# Patient Record
Sex: FEMALE | Race: WHITE | NOT HISPANIC OR LATINO | Employment: OTHER | URBAN - METROPOLITAN AREA
[De-identification: names, ages, dates, MRNs, and addresses within clinical notes are randomized per-mention and may not be internally consistent; named-entity substitution may affect disease eponyms.]

---

## 2021-03-05 ENCOUNTER — APPOINTMENT (EMERGENCY)
Dept: RADIOLOGY | Facility: HOSPITAL | Age: 66
End: 2021-03-05
Payer: MEDICARE

## 2021-03-05 ENCOUNTER — APPOINTMENT (EMERGENCY)
Dept: ULTRASOUND IMAGING | Facility: HOSPITAL | Age: 66
End: 2021-03-05
Payer: MEDICARE

## 2021-03-05 ENCOUNTER — HOSPITAL ENCOUNTER (EMERGENCY)
Facility: HOSPITAL | Age: 66
Discharge: HOME | End: 2021-03-05
Attending: EMERGENCY MEDICINE | Admitting: EMERGENCY MEDICINE
Payer: MEDICARE

## 2021-03-05 VITALS
DIASTOLIC BLOOD PRESSURE: 72 MMHG | WEIGHT: 203 LBS | OXYGEN SATURATION: 98 % | HEART RATE: 70 BPM | HEIGHT: 64 IN | TEMPERATURE: 98 F | RESPIRATION RATE: 18 BRPM | SYSTOLIC BLOOD PRESSURE: 165 MMHG | BODY MASS INDEX: 34.66 KG/M2

## 2021-03-05 DIAGNOSIS — R60.9 PERIPHERAL EDEMA: Primary | ICD-10-CM

## 2021-03-05 LAB
ALBUMIN SERPL BCP-MCNC: 3.4 G/DL (ref 3.5–5)
ALP SERPL-CCNC: 90 U/L (ref 46–116)
ALT SERPL W P-5'-P-CCNC: 68 U/L (ref 12–78)
ANION GAP SERPL CALCULATED.3IONS-SCNC: 7 MMOL/L (ref 4–13)
AST SERPL W P-5'-P-CCNC: 79 U/L (ref 5–45)
BASOPHILS # BLD AUTO: 0.06 THOUSANDS/ΜL (ref 0–0.1)
BASOPHILS NFR BLD AUTO: 1 % (ref 0–1)
BILIRUB SERPL-MCNC: 0.59 MG/DL (ref 0.2–1)
BUN SERPL-MCNC: 13 MG/DL (ref 5–25)
CALCIUM ALBUM COR SERPL-MCNC: 9.4 MG/DL (ref 8.3–10.1)
CALCIUM SERPL-MCNC: 8.9 MG/DL (ref 8.3–10.1)
CHLORIDE SERPL-SCNC: 103 MMOL/L (ref 100–108)
CO2 SERPL-SCNC: 32 MMOL/L (ref 21–32)
CREAT SERPL-MCNC: 0.7 MG/DL (ref 0.6–1.3)
EOSINOPHIL # BLD AUTO: 0.14 THOUSAND/ΜL (ref 0–0.61)
EOSINOPHIL NFR BLD AUTO: 2 % (ref 0–6)
ERYTHROCYTE [DISTWIDTH] IN BLOOD BY AUTOMATED COUNT: 15 % (ref 11.6–15.1)
GFR SERPL CREATININE-BSD FRML MDRD: 91 ML/MIN/1.73SQ M
GLUCOSE SERPL-MCNC: 125 MG/DL (ref 65–140)
HCT VFR BLD AUTO: 47.7 % (ref 34.8–46.1)
HGB BLD-MCNC: 15.7 G/DL (ref 11.5–15.4)
IMM GRANULOCYTES # BLD AUTO: 0.02 THOUSAND/UL (ref 0–0.2)
IMM GRANULOCYTES NFR BLD AUTO: 0 % (ref 0–2)
LYMPHOCYTES # BLD AUTO: 1.61 THOUSANDS/ΜL (ref 0.6–4.47)
LYMPHOCYTES NFR BLD AUTO: 21 % (ref 14–44)
MCH RBC QN AUTO: 35.9 PG (ref 26.8–34.3)
MCHC RBC AUTO-ENTMCNC: 32.9 G/DL (ref 31.4–37.4)
MCV RBC AUTO: 109 FL (ref 82–98)
MONOCYTES # BLD AUTO: 0.55 THOUSAND/ΜL (ref 0.17–1.22)
MONOCYTES NFR BLD AUTO: 7 % (ref 4–12)
NEUTROPHILS # BLD AUTO: 5.39 THOUSANDS/ΜL (ref 1.85–7.62)
NEUTS SEG NFR BLD AUTO: 69 % (ref 43–75)
NRBC BLD AUTO-RTO: 0 /100 WBCS
NT-PROBNP SERPL-MCNC: 182 PG/ML
PLATELET # BLD AUTO: 147 THOUSANDS/UL (ref 149–390)
PMV BLD AUTO: 10.5 FL (ref 8.9–12.7)
POTASSIUM SERPL-SCNC: 3.7 MMOL/L (ref 3.5–5.3)
PROT SERPL-MCNC: 7.7 G/DL (ref 6.4–8.2)
RBC # BLD AUTO: 4.37 MILLION/UL (ref 3.81–5.12)
SODIUM SERPL-SCNC: 142 MMOL/L (ref 136–145)
TROPONIN I SERPL-MCNC: <0.02 NG/ML
WBC # BLD AUTO: 7.77 THOUSAND/UL (ref 4.31–10.16)

## 2021-03-05 PROCEDURE — 71045 X-RAY EXAM CHEST 1 VIEW: CPT

## 2021-03-05 PROCEDURE — 36415 COLL VENOUS BLD VENIPUNCTURE: CPT | Performed by: PHYSICIAN ASSISTANT

## 2021-03-05 PROCEDURE — 84484 ASSAY OF TROPONIN QUANT: CPT | Performed by: PHYSICIAN ASSISTANT

## 2021-03-05 PROCEDURE — 80053 COMPREHEN METABOLIC PANEL: CPT | Performed by: PHYSICIAN ASSISTANT

## 2021-03-05 PROCEDURE — 93971 EXTREMITY STUDY: CPT

## 2021-03-05 PROCEDURE — 85025 COMPLETE CBC W/AUTO DIFF WBC: CPT | Performed by: PHYSICIAN ASSISTANT

## 2021-03-05 PROCEDURE — 99284 EMERGENCY DEPT VISIT MOD MDM: CPT

## 2021-03-05 PROCEDURE — 93971 EXTREMITY STUDY: CPT | Performed by: SURGERY

## 2021-03-05 PROCEDURE — 99285 EMERGENCY DEPT VISIT HI MDM: CPT | Performed by: PHYSICIAN ASSISTANT

## 2021-03-05 PROCEDURE — 83880 ASSAY OF NATRIURETIC PEPTIDE: CPT | Performed by: PHYSICIAN ASSISTANT

## 2021-03-05 NOTE — ED PROVIDER NOTES
History  Chief Complaint   Patient presents with    Leg Swelling     Pt reports having b/l leg swelling since Jan 2 65 yo with leg swelling  Onset January second  Constant since onset  Bilateral but left worse than right  States they feel "rock hard"  Pain in left calf  No pain on right  No color change  No rash or redness  No fever or chills  Has never had this before  Has chronic sob and dyspnea on exertion 2/2 to her COPD and is currently unchanged from baseline  Denies chest pain  No h/o VTE  No recent travels, traumas or surgeries  States she came for evaluation today because her daughter is here getting routine labs drawn and she figured she would have it evaluated while she was getting those labs done  History provided by:  Patient   used: No    Leg Pain  Lower extremity pain location: bilateral lower extremities  Time since incident:  2 months  Injury: no    Pain details:     Quality:  Aching    Radiates to:  Does not radiate    Severity:  Moderate    Onset quality:  Gradual    Duration:  2 months    Timing:  Intermittent    Progression:  Waxing and waning  Chronicity:  New  Dislocation: no    Foreign body present:  No foreign bodies  Prior injury to area:  No  Relieved by:  Nothing  Worsened by:  Nothing  Ineffective treatments:  None tried  Associated symptoms: swelling    Associated symptoms: no back pain, no decreased ROM, no fatigue, no fever, no itching, no muscle weakness, no neck pain, no numbness, no stiffness and no tingling        None       Past Medical History:   Diagnosis Date    COPD (chronic obstructive pulmonary disease) ([facility] [address] )     Hyperlipemia     Hypertension        History reviewed  No pertinent surgical history  History reviewed  No pertinent family history  I have reviewed and agree with the history as documented      E-Cigarette/Vaping    E-Cigarette Use Never User      E-Cigarette/Vaping Substances     Social History     Tobacco Use    Smoking status: Current Every Day Smoker     Packs/day: 0 50    Smokeless tobacco: Never Used   Substance Use Topics    Alcohol use: Yes     Drinks per session: 1 or 2    Drug use: Never       Review of Systems   Constitutional: Negative for activity change, appetite change, chills, diaphoresis, fatigue, fever and unexpected weight change  HENT: Negative for congestion, rhinorrhea, sinus pressure, sore throat and trouble swallowing  Eyes: Negative for photophobia and visual disturbance  Respiratory: Negative for apnea, cough, choking, chest tightness, shortness of breath, wheezing and stridor  Cardiovascular: Positive for leg swelling  Negative for chest pain and palpitations  Gastrointestinal: Negative for abdominal distention, abdominal pain, blood in stool, constipation, diarrhea, nausea and vomiting  Genitourinary: Negative for decreased urine volume, difficulty urinating, dysuria, enuresis, flank pain, frequency, hematuria and urgency  Musculoskeletal: Negative for arthralgias, back pain, myalgias, neck pain, neck stiffness and stiffness  Skin: Negative for color change, itching, pallor, rash and wound  Allergic/Immunologic: Negative  Neurological: Negative for dizziness, tremors, syncope, weakness, light-headedness, numbness and headaches  Hematological: Negative  Psychiatric/Behavioral: Negative  All other systems reviewed and are negative  Physical Exam  Physical Exam  Vitals signs and nursing note reviewed  Constitutional:       General: She is not in acute distress  Appearance: Normal appearance  She is well-developed  She is not ill-appearing, toxic-appearing or diaphoretic  HENT:      Head: Normocephalic and atraumatic  Eyes:      General: Lids are normal       Pupils: Pupils are equal, round, and reactive to light  Neck:      Musculoskeletal: Normal range of motion and neck supple  Cardiovascular:      Rate and Rhythm: Normal rate and regular rhythm  Pulses: Normal pulses  No decreased pulses  Radial pulses are 2+ on the right side and 2+ on the left side  Heart sounds: Normal heart sounds, S1 normal and S2 normal  Heart sounds not distant  No murmur  No friction rub  No gallop  Pulmonary:      Effort: Pulmonary effort is normal  No tachypnea, bradypnea, accessory muscle usage or respiratory distress  Breath sounds: Normal breath sounds  No decreased breath sounds, wheezing, rhonchi or rales  Abdominal:      General: There is no distension  Palpations: Abdomen is soft  Abdomen is not rigid  Tenderness: There is no abdominal tenderness  There is no guarding or rebound  Musculoskeletal: Normal range of motion  General: No tenderness or deformity  Right lower le+ Pitting Edema present  Left lower le+ Pitting Edema present  Skin:     General: Skin is warm and dry  Coloration: Skin is not pale  Findings: No erythema or rash  Neurological:      Mental Status: She is alert and oriented to person, place, and time  GCS: GCS eye subscore is 4  GCS verbal subscore is 5  GCS motor subscore is 6  Cranial Nerves: No cranial nerve deficit     Psychiatric:         Speech: Speech normal          Vital Signs  ED Triage Vitals [21 1420]   Temperature Pulse Respirations Blood Pressure SpO2   98 °F (36 7 °C) 69 16 160/70 98 %      Temp Source Heart Rate Source Patient Position - Orthostatic VS BP Location FiO2 (%)   Oral Monitor Sitting Left arm --      Pain Score       --           Vitals:    21 1420 21 1744   BP: 160/70 165/72   Pulse: 69 70   Patient Position - Orthostatic VS: Sitting Sitting         Visual Acuity      ED Medications  Medications - No data to display    Diagnostic Studies  Results Reviewed     Procedure Component Value Units Date/Time    NT-BNP PRO [[id number]]  (Abnormal) Collected: 21 1617    Lab Status: Final result Specimen: Blood from Arm, Right Updated: 03/05/21 1700     NT-proBNP 182 pg/mL     Troponin I [[id number]]  (Normal) Collected: 03/05/21 1617    Lab Status: Final result Specimen: Blood from Arm, Right Updated: 03/05/21 1658     Troponin I <0 02 ng/mL     Comprehensive metabolic panel [[id number]]  (Abnormal) Collected: 03/05/21 1617    Lab Status: Final result Specimen: Blood from Arm, Right Updated: 03/05/21 1649     Sodium 142 mmol/L      Potassium 3 7 mmol/L      Chloride 103 mmol/L      CO2 32 mmol/L      ANION GAP 7 mmol/L      BUN 13 mg/dL      Creatinine 0 70 mg/dL      Glucose 125 mg/dL      Calcium 8 9 mg/dL      Corrected Calcium 9 4 mg/dL      AST 79 U/L      ALT 68 U/L      Alkaline Phosphatase 90 U/L      Total Protein 7 7 g/dL      Albumin 3 4 g/dL      Total Bilirubin 0 59 mg/dL      eGFR 91 ml/min/1 73sq m     Narrative:      [place] guidelines for Chronic Kidney Disease (CKD):     Stage 1 with normal or high GFR (GFR > 90 mL/min/1 73 square meters)    Stage 2 Mild CKD (GFR = 60-89 mL/min/1 73 square meters)    Stage 3A Moderate CKD (GFR = 45-59 mL/min/1 73 square meters)    Stage 3B Moderate CKD (GFR = 30-44 mL/min/1 73 square meters)    Stage 4 Severe CKD (GFR = 15-29 mL/min/1 73 square meters)    Stage 5 End Stage CKD (GFR <15 mL/min/1 73 square meters)  Note: GFR calculation is accurate only with a steady state creatinine    CBC and differential [[id number]]  (Abnormal) Collected: 03/05/21 1617    Lab Status: Final result Specimen: Blood from Arm, Right Updated: 03/05/21 1624     WBC 7 77 Thousand/uL      RBC 4 37 Million/uL      Hemoglobin 15 7 g/dL      Hematocrit 47 7 %       fL      MCH 35 9 pg      MCHC 32 9 g/dL      RDW 15 0 %      MPV 10 5 fL      Platelets 864 Thousands/uL      nRBC 0 /100 WBCs      Neutrophils Relative 69 %      Immat GRANS % 0 %      Lymphocytes Relative 21 %      Monocytes Relative 7 %      Eosinophils Relative 2 %      Basophils Relative 1 %      Neutrophils Absolute 5 39 Thousands/µL      Immature Grans Absolute 0 02 Thousand/uL      Lymphocytes Absolute 1 61 Thousands/µL      Monocytes Absolute 0 55 Thousand/µL      Eosinophils Absolute 0 14 Thousand/µL      Basophils Absolute 0 06 Thousands/µL                  VAS lower limb venous duplex study, unilateral/limited   Final Result by Theresa Ray DO (03/05 2023)      XR chest portable   Final Result by Fawad Henriquez MD (03/05 1559)      No acute cardiopulmonary disease  Workstation performed: [id number]                    Procedures  ECG 12 Lead Documentation Only    Date/Time: 3/5/2021 3:56 PM  Performed by: Noe Deleon PA-C  Authorized by: Noe Deleon PA-C     Indications / Diagnosis:  Leg swelling  ECG reviewed by me, the ED Provider: yes    Patient location:  ED  Previous ECG:     Previous ECG:  Unavailable    Comparison to cardiac monitor: Yes    Interpretation:     Interpretation: normal    Quality:     Tracing quality:  Limited by artifact  Rate:     ECG rate:  56    ECG rate assessment: normal    Rhythm:     Rhythm: sinus bradycardia    Ectopy:     Ectopy: none    QRS:     QRS axis:  Normal    QRS intervals:  Normal  Conduction:     Conduction: normal    ST segments:     ST segments:  Normal  T waves:     T waves: normal               ED Course  ED Course as of Mar 08 2154   Fri Mar 05, 2021   1611 Prelim DVT report negative  VAS lower limb venous duplex study, unilateral/limited                             SBIRT 22yo+      Most Recent Value   SBIRT (24 yo +)   In order to provide better care to our patients, we are screening all of our patients for alcohol and drug use  Would it be okay to ask you these screening questions? Yes Filed at: 03/05/2021 1420   Initial Alcohol Screen: US AUDIT-C    1  How often do you have a drink containing alcohol?  0 Filed at: 03/05/2021 1420   2  How many drinks containing alcohol do you have on a typical day you are drinking?    0 Filed at: 03/05/2021 1420   3a  Male UNDER 65: How often do you have five or more drinks on one occasion? 0 Filed at: 03/05/2021 1420   3b  FEMALE Any Age, or MALE 65+: How often do you have 4 or more drinks on one occassion? 0 Filed at: 03/05/2021 1420   Audit-C Score  0 Filed at: 03/05/2021 1420   GHANSHYAM: How many times in the past year have you    Used an illegal drug or used a prescription medication for non-medical reasons? Never Filed at: 03/05/2021 1420                    MDM  Number of Diagnoses or Management Options  Peripheral edema: new and requires workup  Diagnosis management comments: DDx including but not limited to: metabolic abnormality, renal failure, thyroid disease, CHF, doubt DVT  Plan: US leg  Labs  XR chest         Amount and/or Complexity of Data Reviewed  Clinical lab tests: ordered and reviewed  Tests in the radiology section of CPT®: ordered and reviewed  Independent visualization of images, tracings, or specimens: yes    Risk of Complications, Morbidity, and/or Mortality  Presenting problems: moderate  Management options: low  General comments: 76 female with leg swelling  3 months  Labs overall unremarkable  XR chest negative  Duplex US negative  Unclear etiology  Possibly dependent edema  Recommended Compression stockings and outpatient follow up  Return if worsening symptoms  Return parameters provided  Pt understands and agrees with plan  Patient Progress  Patient progress: stable      Disposition  Final diagnoses:   Peripheral edema     Time reflects when diagnosis was documented in both MDM as applicable and the Disposition within this note     Time User Action Codes Description Comment    3/5/2021  5:35 PM Ministerio Vargas [R60 9] Peripheral edema       ED Disposition     ED Disposition Condition Date/Time Comment    Discharge Stable Fri Mar 5, 2021  5:35 PM Nelda Fonseca discharge to home/self care              Follow-up Information     Follow up With Specialties Details Why Contact Info    Eugenia Yung MD Internal Medicine   [facility] [address]  [phone]            There are no discharge medications for this patient  No discharge procedures on file      PDMP Review     None          ED Provider  Electronically Signed by           Yudi De Anda PA-C  03/08/21 7814

## 2022-10-24 ENCOUNTER — HOSPITAL ENCOUNTER (INPATIENT)
Facility: HOSPITAL | Age: 67
LOS: 1 days | Discharge: HOME | End: 2022-10-25
Attending: EMERGENCY MEDICINE | Admitting: STUDENT IN AN ORGANIZED HEALTH CARE EDUCATION/TRAINING PROGRAM
Payer: MEDICARE

## 2022-10-24 ENCOUNTER — APPOINTMENT (EMERGENCY)
Dept: CT IMAGING | Facility: HOSPITAL | Age: 67
End: 2022-10-24
Payer: MEDICARE

## 2022-10-24 ENCOUNTER — APPOINTMENT (EMERGENCY)
Dept: RADIOLOGY | Facility: HOSPITAL | Age: 67
End: 2022-10-24
Payer: MEDICARE

## 2022-10-24 ENCOUNTER — APPOINTMENT (INPATIENT)
Dept: NON INVASIVE DIAGNOSTICS | Facility: HOSPITAL | Age: 67
End: 2022-10-24
Payer: MEDICARE

## 2022-10-24 DIAGNOSIS — I10 PRIMARY HYPERTENSION: ICD-10-CM

## 2022-10-24 DIAGNOSIS — J44.1 COPD EXACERBATION (HCC): Primary | ICD-10-CM

## 2022-10-24 DIAGNOSIS — R77.8 ELEVATED TROPONIN: ICD-10-CM

## 2022-10-24 DIAGNOSIS — R79.89 ELEVATED BRAIN NATRIURETIC PEPTIDE (BNP) LEVEL: ICD-10-CM

## 2022-10-24 DIAGNOSIS — R09.02 HYPOXIA: ICD-10-CM

## 2022-10-24 PROBLEM — I5A MYOCARDIAL INJURY: Status: ACTIVE | Noted: 2022-10-24

## 2022-10-24 PROBLEM — J96.01 ACUTE RESPIRATORY FAILURE WITH HYPOXIA (HCC): Status: ACTIVE | Noted: 2022-10-24

## 2022-10-24 PROBLEM — E04.1 THYROID NODULE: Status: ACTIVE | Noted: 2022-10-24

## 2022-10-24 LAB
2HR DELTA HS TROPONIN: 93 NG/L
4HR DELTA HS TROPONIN: 90 NG/L
ALBUMIN SERPL BCP-MCNC: 3.5 G/DL (ref 3.5–5)
ALP SERPL-CCNC: 115 U/L (ref 46–116)
ALT SERPL W P-5'-P-CCNC: 54 U/L (ref 12–78)
ANION GAP SERPL CALCULATED.3IONS-SCNC: 10 MMOL/L (ref 4–13)
AORTIC ROOT: 2.9 CM
AORTIC VALVE MEAN VELOCITY: 13.5 M/S
APICAL FOUR CHAMBER EJECTION FRACTION: 70 %
ASCENDING AORTA: 3.9 CM
AST SERPL W P-5'-P-CCNC: 53 U/L (ref 5–45)
ATRIAL RATE: 60 BPM
AV AREA BY CONTINUOUS VTI: 1.9 CM2
AV AREA PEAK VELOCITY: 1.8 CM2
AV LVOT MEAN GRADIENT: 4 MMHG
AV LVOT PEAK GRADIENT: 9 MMHG
AV MEAN GRADIENT: 8 MMHG
AV PEAK GRADIENT: 17 MMHG
AV VALVE AREA: 1.87 CM2
AV VELOCITY RATIO: 0.72
BASE EX.OXY STD BLDV CALC-SCNC: 62.8 % (ref 60–80)
BASE EXCESS BLDV CALC-SCNC: 3.4 MMOL/L
BASOPHILS # BLD AUTO: 0.04 THOUSANDS/ÂΜL (ref 0–0.1)
BASOPHILS NFR BLD AUTO: 0 % (ref 0–1)
BILIRUB DIRECT SERPL-MCNC: 0.29 MG/DL (ref 0–0.2)
BILIRUB SERPL-MCNC: 0.94 MG/DL (ref 0.2–1)
BUN SERPL-MCNC: 7 MG/DL (ref 5–25)
CALCIUM SERPL-MCNC: 8.9 MG/DL (ref 8.3–10.1)
CARDIAC TROPONIN I PNL SERPL HS: 222 NG/L
CARDIAC TROPONIN I PNL SERPL HS: 312 NG/L
CARDIAC TROPONIN I PNL SERPL HS: 315 NG/L
CHLORIDE SERPL-SCNC: 100 MMOL/L (ref 96–108)
CO2 SERPL-SCNC: 30 MMOL/L (ref 21–32)
CREAT SERPL-MCNC: 0.71 MG/DL (ref 0.6–1.3)
DOP CALC AO PEAK VEL: 2.08 M/S
DOP CALC AO VTI: 47.79 CM
DOP CALC LVOT AREA: 2.54 CM2
DOP CALC LVOT DIAMETER: 1.8 CM
DOP CALC LVOT PEAK VEL VTI: 35.21 CM
DOP CALC LVOT PEAK VEL: 1.49 M/S
DOP CALC LVOT STROKE INDEX: 47.1 ML/M2
DOP CALC LVOT STROKE VOLUME: 89.55 CM3
E WAVE DECELERATION TIME: 279 MS
EOSINOPHIL # BLD AUTO: 0.37 THOUSAND/ÂΜL (ref 0–0.61)
EOSINOPHIL NFR BLD AUTO: 4 % (ref 0–6)
ERYTHROCYTE [DISTWIDTH] IN BLOOD BY AUTOMATED COUNT: 15.2 % (ref 11.6–15.1)
FLUAV RNA RESP QL NAA+PROBE: NEGATIVE
FLUBV RNA RESP QL NAA+PROBE: NEGATIVE
FRACTIONAL SHORTENING: 44 % (ref 28–44)
GFR SERPL CREATININE-BSD FRML MDRD: 88 ML/MIN/1.73SQ M
GLUCOSE SERPL-MCNC: 117 MG/DL (ref 65–140)
HCO3 BLDV-SCNC: 31.9 MMOL/L (ref 24–30)
HCT VFR BLD AUTO: 47.6 % (ref 34.8–46.1)
HGB BLD-MCNC: 15.8 G/DL (ref 11.5–15.4)
IMM GRANULOCYTES # BLD AUTO: 0.04 THOUSAND/UL (ref 0–0.2)
IMM GRANULOCYTES NFR BLD AUTO: 0 % (ref 0–2)
INTERVENTRICULAR SEPTUM IN DIASTOLE (PARASTERNAL SHORT AXIS VIEW): 0.9 CM
INTERVENTRICULAR SEPTUM: 0.9 CM (ref 0.6–1.1)
LAAS-AP2: 20.6 CM2
LAAS-AP4: 21.7 CM2
LEFT ATRIUM AREA SYSTOLE SINGLE PLANE A4C: 22 CM2
LEFT ATRIUM SIZE: 3.9 CM
LEFT INTERNAL DIMENSION IN SYSTOLE: 2.9 CM (ref 2.1–4)
LEFT VENTRICULAR INTERNAL DIMENSION IN DIASTOLE: 5.2 CM (ref 3.5–6)
LEFT VENTRICULAR POSTERIOR WALL IN END DIASTOLE: 0.7 CM
LEFT VENTRICULAR STROKE VOLUME: 97 ML
LVSV (TEICH): 97 ML
LYMPHOCYTES # BLD AUTO: 1.77 THOUSANDS/ÂΜL (ref 0.6–4.47)
LYMPHOCYTES NFR BLD AUTO: 19 % (ref 14–44)
MCH RBC QN AUTO: 35.4 PG (ref 26.8–34.3)
MCHC RBC AUTO-ENTMCNC: 33.2 G/DL (ref 31.4–37.4)
MCV RBC AUTO: 107 FL (ref 82–98)
MONOCYTES # BLD AUTO: 0.56 THOUSAND/ÂΜL (ref 0.17–1.22)
MONOCYTES NFR BLD AUTO: 6 % (ref 4–12)
MV E'TISSUE VEL-SEP: 6 CM/S
MV PEAK A VEL: 1.28 M/S
MV PEAK E VEL: 88 CM/S
MV STENOSIS PRESSURE HALF TIME: 81 MS
MV VALVE AREA P 1/2 METHOD: 2.72 CM2
NEUTROPHILS # BLD AUTO: 6.79 THOUSANDS/ÂΜL (ref 1.85–7.62)
NEUTS SEG NFR BLD AUTO: 71 % (ref 43–75)
NRBC BLD AUTO-RTO: 0 /100 WBCS
NT-PROBNP SERPL-MCNC: 782 PG/ML
O2 CT BLDV-SCNC: 15.1 ML/DL
P AXIS: 64 DEGREES
PA SYSTOLIC PRESSURE: 43 MMHG
PCO2 BLDV: 63.4 MM HG (ref 42–50)
PH BLDV: 7.32 [PH] (ref 7.3–7.4)
PLATELET # BLD AUTO: 153 THOUSANDS/UL (ref 149–390)
PMV BLD AUTO: 11.3 FL (ref 8.9–12.7)
PO2 BLDV: 37.4 MM HG (ref 35–45)
POTASSIUM SERPL-SCNC: 3.4 MMOL/L (ref 3.5–5.3)
PR INTERVAL: 136 MS
PROCALCITONIN SERPL-MCNC: 0.05 NG/ML
PROT SERPL-MCNC: 8.2 G/DL (ref 6.4–8.4)
QRS AXIS: 62 DEGREES
QRSD INTERVAL: 106 MS
QT INTERVAL: 452 MS
QTC INTERVAL: 452 MS
RBC # BLD AUTO: 4.46 MILLION/UL (ref 3.81–5.12)
RIGHT ATRIUM AREA SYSTOLE A4C: 15.6 CM2
RIGHT VENTRICLE ID DIMENSION: 3.8 CM
RSV RNA RESP QL NAA+PROBE: NEGATIVE
SARS-COV-2 RNA RESP QL NAA+PROBE: NEGATIVE
SL CV LEFT ATRIUM LENGTH A2C: 5.6 CM
SL CV LV EF: 60
SL CV PED ECHO LEFT VENTRICLE DIASTOLIC VOLUME (MOD BIPLANE) 2D: 128 ML
SL CV PED ECHO LEFT VENTRICLE SYSTOLIC VOLUME (MOD BIPLANE) 2D: 31 ML
SODIUM SERPL-SCNC: 140 MMOL/L (ref 135–147)
T WAVE AXIS: 105 DEGREES
TR MAX PG: 38 MMHG
TR PEAK VELOCITY: 3.1 M/S
TRICUSPID VALVE PEAK REGURGITATION VELOCITY: 3.09 M/S
VENTRICULAR RATE: 60 BPM
WBC # BLD AUTO: 9.57 THOUSAND/UL (ref 4.31–10.16)

## 2022-10-24 PROCEDURE — 93005 ELECTROCARDIOGRAM TRACING: CPT

## 2022-10-24 PROCEDURE — 82805 BLOOD GASES W/O2 SATURATION: CPT | Performed by: EMERGENCY MEDICINE

## 2022-10-24 PROCEDURE — 80076 HEPATIC FUNCTION PANEL: CPT | Performed by: EMERGENCY MEDICINE

## 2022-10-24 PROCEDURE — 94760 N-INVAS EAR/PLS OXIMETRY 1: CPT

## 2022-10-24 PROCEDURE — 99291 CRITICAL CARE FIRST HOUR: CPT | Performed by: EMERGENCY MEDICINE

## 2022-10-24 PROCEDURE — 0241U HB NFCT DS VIR RESP RNA 4 TRGT: CPT | Performed by: EMERGENCY MEDICINE

## 2022-10-24 PROCEDURE — 85025 COMPLETE CBC W/AUTO DIFF WBC: CPT | Performed by: EMERGENCY MEDICINE

## 2022-10-24 PROCEDURE — 99223 1ST HOSP IP/OBS HIGH 75: CPT | Performed by: INTERNAL MEDICINE

## 2022-10-24 PROCEDURE — 94640 AIRWAY INHALATION TREATMENT: CPT

## 2022-10-24 PROCEDURE — 83880 ASSAY OF NATRIURETIC PEPTIDE: CPT | Performed by: EMERGENCY MEDICINE

## 2022-10-24 PROCEDURE — 93306 TTE W/DOPPLER COMPLETE: CPT

## 2022-10-24 PROCEDURE — 99223 1ST HOSP IP/OBS HIGH 75: CPT | Performed by: FAMILY MEDICINE

## 2022-10-24 PROCEDURE — 71275 CT ANGIOGRAPHY CHEST: CPT

## 2022-10-24 PROCEDURE — 84145 PROCALCITONIN (PCT): CPT | Performed by: PHYSICIAN ASSISTANT

## 2022-10-24 PROCEDURE — 84484 ASSAY OF TROPONIN QUANT: CPT | Performed by: EMERGENCY MEDICINE

## 2022-10-24 PROCEDURE — 71045 X-RAY EXAM CHEST 1 VIEW: CPT

## 2022-10-24 PROCEDURE — 80048 BASIC METABOLIC PNL TOTAL CA: CPT | Performed by: EMERGENCY MEDICINE

## 2022-10-24 PROCEDURE — 36415 COLL VENOUS BLD VENIPUNCTURE: CPT | Performed by: EMERGENCY MEDICINE

## 2022-10-24 PROCEDURE — G1004 CDSM NDSC: HCPCS

## 2022-10-24 PROCEDURE — 93306 TTE W/DOPPLER COMPLETE: CPT | Performed by: INTERNAL MEDICINE

## 2022-10-24 RX ORDER — ASPIRIN 81 MG/1
81 TABLET ORAL DAILY
COMMUNITY
End: 2022-10-25

## 2022-10-24 RX ORDER — LISINOPRIL 20 MG/1
20 TABLET ORAL DAILY
Status: DISCONTINUED | OUTPATIENT
Start: 2022-10-24 | End: 2022-10-25 | Stop reason: HOSPADM

## 2022-10-24 RX ORDER — ATORVASTATIN CALCIUM 40 MG/1
40 TABLET, FILM COATED ORAL DAILY
COMMUNITY
Start: 2022-09-19

## 2022-10-24 RX ORDER — IPRATROPIUM BROMIDE AND ALBUTEROL SULFATE .5; 3 MG/3ML; MG/3ML
2 SOLUTION RESPIRATORY (INHALATION) ONCE
Status: COMPLETED | OUTPATIENT
Start: 2022-10-24 | End: 2022-10-24

## 2022-10-24 RX ORDER — FUROSEMIDE 20 MG/1
20 TABLET ORAL DAILY
COMMUNITY
End: 2022-10-25

## 2022-10-24 RX ORDER — ALBUTEROL SULFATE 90 UG/1
2 AEROSOL, METERED RESPIRATORY (INHALATION) EVERY 6 HOURS PRN
Status: DISCONTINUED | OUTPATIENT
Start: 2022-10-24 | End: 2022-10-25 | Stop reason: HOSPADM

## 2022-10-24 RX ORDER — ATORVASTATIN CALCIUM 40 MG/1
40 TABLET, FILM COATED ORAL DAILY
Status: DISCONTINUED | OUTPATIENT
Start: 2022-10-24 | End: 2022-10-25 | Stop reason: HOSPADM

## 2022-10-24 RX ORDER — ACETAMINOPHEN 325 MG/1
650 TABLET ORAL ONCE
Status: COMPLETED | OUTPATIENT
Start: 2022-10-24 | End: 2022-10-24

## 2022-10-24 RX ORDER — METHYLPREDNISOLONE SOD SUCC 125 MG
1 VIAL (EA) INJECTION ONCE
Status: COMPLETED | OUTPATIENT
Start: 2022-10-24 | End: 2022-10-24

## 2022-10-24 RX ORDER — ALPRAZOLAM 0.25 MG/1
0.25 TABLET ORAL 2 TIMES DAILY PRN
COMMUNITY
Start: 2022-09-09

## 2022-10-24 RX ORDER — ASPIRIN 81 MG/1
81 TABLET ORAL DAILY
COMMUNITY

## 2022-10-24 RX ORDER — LEVALBUTEROL 1.25 MG/.5ML
1.25 SOLUTION, CONCENTRATE RESPIRATORY (INHALATION)
Status: DISCONTINUED | OUTPATIENT
Start: 2022-10-24 | End: 2022-10-25 | Stop reason: HOSPADM

## 2022-10-24 RX ORDER — FUROSEMIDE 40 MG/1
20 TABLET ORAL DAILY
Status: DISCONTINUED | OUTPATIENT
Start: 2022-10-24 | End: 2022-10-24

## 2022-10-24 RX ORDER — FUROSEMIDE 10 MG/ML
40 INJECTION INTRAMUSCULAR; INTRAVENOUS ONCE
Status: COMPLETED | OUTPATIENT
Start: 2022-10-24 | End: 2022-10-24

## 2022-10-24 RX ORDER — IPRATROPIUM BROMIDE AND ALBUTEROL SULFATE 2.5; .5 MG/3ML; MG/3ML
3 SOLUTION RESPIRATORY (INHALATION) ONCE
Status: COMPLETED | OUTPATIENT
Start: 2022-10-24 | End: 2022-10-24

## 2022-10-24 RX ORDER — ALBUTEROL SULFATE 90 UG/1
2 AEROSOL, METERED RESPIRATORY (INHALATION) EVERY 6 HOURS PRN
COMMUNITY

## 2022-10-24 RX ORDER — LISINOPRIL 10 MG/1
10 TABLET ORAL DAILY
COMMUNITY
End: 2022-10-25

## 2022-10-24 RX ORDER — ENOXAPARIN SODIUM 100 MG/ML
40 INJECTION SUBCUTANEOUS DAILY
Status: DISCONTINUED | OUTPATIENT
Start: 2022-10-24 | End: 2022-10-25 | Stop reason: HOSPADM

## 2022-10-24 RX ORDER — AMLODIPINE BESYLATE 5 MG/1
5 TABLET ORAL DAILY
Status: DISCONTINUED | OUTPATIENT
Start: 2022-10-24 | End: 2022-10-24

## 2022-10-24 RX ORDER — METHYLPREDNISOLONE SODIUM SUCCINATE 40 MG/ML
40 INJECTION, POWDER, LYOPHILIZED, FOR SOLUTION INTRAMUSCULAR; INTRAVENOUS EVERY 8 HOURS
Status: DISCONTINUED | OUTPATIENT
Start: 2022-10-24 | End: 2022-10-25 | Stop reason: HOSPADM

## 2022-10-24 RX ORDER — POTASSIUM CHLORIDE 20 MEQ/1
40 TABLET, EXTENDED RELEASE ORAL ONCE
Status: COMPLETED | OUTPATIENT
Start: 2022-10-24 | End: 2022-10-24

## 2022-10-24 RX ORDER — ASPIRIN 81 MG/1
81 TABLET ORAL DAILY
Status: DISCONTINUED | OUTPATIENT
Start: 2022-10-24 | End: 2022-10-25 | Stop reason: HOSPADM

## 2022-10-24 RX ORDER — LISINOPRIL 10 MG/1
10 TABLET ORAL DAILY
Status: DISCONTINUED | OUTPATIENT
Start: 2022-10-24 | End: 2022-10-24

## 2022-10-24 RX ORDER — AMLODIPINE BESYLATE 5 MG/1
TABLET ORAL DAILY
COMMUNITY
End: 2022-10-25

## 2022-10-24 RX ORDER — AZITHROMYCIN 500 MG/1
500 TABLET, FILM COATED ORAL EVERY 24 HOURS
Status: DISCONTINUED | OUTPATIENT
Start: 2022-10-24 | End: 2022-10-25 | Stop reason: HOSPADM

## 2022-10-24 RX ORDER — ATORVASTATIN CALCIUM 40 MG/1
40 TABLET, FILM COATED ORAL DAILY
COMMUNITY
End: 2022-10-24

## 2022-10-24 RX ORDER — METOPROLOL SUCCINATE 50 MG/1
50 TABLET, EXTENDED RELEASE ORAL DAILY
Status: ON HOLD | COMMUNITY
End: 2022-11-02 | Stop reason: SDUPTHER

## 2022-10-24 RX ORDER — ALBUTEROL SULFATE 2.5 MG/3ML
5 SOLUTION RESPIRATORY (INHALATION) ONCE
Status: COMPLETED | OUTPATIENT
Start: 2022-10-24 | End: 2022-10-24

## 2022-10-24 RX ORDER — ACETAMINOPHEN 325 MG/1
650 TABLET ORAL EVERY 6 HOURS PRN
Status: DISCONTINUED | OUTPATIENT
Start: 2022-10-24 | End: 2022-10-25 | Stop reason: HOSPADM

## 2022-10-24 RX ORDER — GUAIFENESIN 600 MG/1
600 TABLET, EXTENDED RELEASE ORAL EVERY 12 HOURS SCHEDULED
Status: DISCONTINUED | OUTPATIENT
Start: 2022-10-24 | End: 2022-10-25 | Stop reason: HOSPADM

## 2022-10-24 RX ORDER — SODIUM CHLORIDE FOR INHALATION 0.9 %
3 VIAL, NEBULIZER (ML) INHALATION
Status: DISCONTINUED | OUTPATIENT
Start: 2022-10-24 | End: 2022-10-24

## 2022-10-24 RX ORDER — METOPROLOL SUCCINATE 50 MG/1
50 TABLET, EXTENDED RELEASE ORAL DAILY
Status: DISCONTINUED | OUTPATIENT
Start: 2022-10-24 | End: 2022-10-25 | Stop reason: HOSPADM

## 2022-10-24 RX ORDER — ALBUTEROL SULFATE 2.5 MG/3ML
2 SOLUTION RESPIRATORY (INHALATION) ONCE
Status: COMPLETED | OUTPATIENT
Start: 2022-10-24 | End: 2022-10-24

## 2022-10-24 RX ADMIN — AZITHROMYCIN 500 MG: 500 TABLET, FILM COATED ORAL at 07:25

## 2022-10-24 RX ADMIN — ASPIRIN 81 MG: 81 TABLET, COATED ORAL at 08:51

## 2022-10-24 RX ADMIN — GUAIFENESIN 600 MG: 600 TABLET ORAL at 08:51

## 2022-10-24 RX ADMIN — ACETAMINOPHEN 650 MG: 325 TABLET, FILM COATED ORAL at 06:22

## 2022-10-24 RX ADMIN — IPRATROPIUM BROMIDE AND ALBUTEROL SULFATE 3 ML: 2.5; .5 SOLUTION RESPIRATORY (INHALATION) at 05:52

## 2022-10-24 RX ADMIN — ENOXAPARIN SODIUM 40 MG: 40 INJECTION SUBCUTANEOUS at 09:06

## 2022-10-24 RX ADMIN — LEVALBUTEROL HYDROCHLORIDE 1.25 MG: 1.25 SOLUTION, CONCENTRATE RESPIRATORY (INHALATION) at 07:31

## 2022-10-24 RX ADMIN — METOPROLOL SUCCINATE 50 MG: 50 TABLET, EXTENDED RELEASE ORAL at 08:51

## 2022-10-24 RX ADMIN — FUROSEMIDE 40 MG: 10 INJECTION, SOLUTION INTRAMUSCULAR; INTRAVENOUS at 06:04

## 2022-10-24 RX ADMIN — UMECLIDINIUM BROMIDE AND VILANTEROL TRIFENATATE 1 PUFF: 62.5; 25 POWDER RESPIRATORY (INHALATION) at 12:37

## 2022-10-24 RX ADMIN — LEVALBUTEROL HYDROCHLORIDE 1.25 MG: 1.25 SOLUTION, CONCENTRATE RESPIRATORY (INHALATION) at 21:18

## 2022-10-24 RX ADMIN — IPRATROPIUM BROMIDE 0.5 MG: 0.5 SOLUTION RESPIRATORY (INHALATION) at 13:29

## 2022-10-24 RX ADMIN — IPRATROPIUM BROMIDE 0.5 MG: 0.5 SOLUTION RESPIRATORY (INHALATION) at 21:18

## 2022-10-24 RX ADMIN — ALBUTEROL SULFATE 5 MG: 2.5 SOLUTION RESPIRATORY (INHALATION) at 02:09

## 2022-10-24 RX ADMIN — GUAIFENESIN 600 MG: 600 TABLET ORAL at 22:39

## 2022-10-24 RX ADMIN — POTASSIUM CHLORIDE 40 MEQ: 1500 TABLET, EXTENDED RELEASE ORAL at 06:04

## 2022-10-24 RX ADMIN — ATORVASTATIN CALCIUM 40 MG: 40 TABLET, FILM COATED ORAL at 08:51

## 2022-10-24 RX ADMIN — METHYLPREDNISOLONE SODIUM SUCCINATE 40 MG: 40 INJECTION, POWDER, FOR SOLUTION INTRAMUSCULAR; INTRAVENOUS at 18:33

## 2022-10-24 RX ADMIN — IPRATROPIUM BROMIDE 0.5 MG: 0.5 SOLUTION RESPIRATORY (INHALATION) at 07:31

## 2022-10-24 RX ADMIN — IOHEXOL 85 ML: 350 INJECTION, SOLUTION INTRAVENOUS at 03:26

## 2022-10-24 RX ADMIN — METHYLPREDNISOLONE SODIUM SUCCINATE 40 MG: 40 INJECTION, POWDER, FOR SOLUTION INTRAMUSCULAR; INTRAVENOUS at 08:52

## 2022-10-24 RX ADMIN — LEVALBUTEROL HYDROCHLORIDE 1.25 MG: 1.25 SOLUTION, CONCENTRATE RESPIRATORY (INHALATION) at 13:29

## 2022-10-24 RX ADMIN — LISINOPRIL 20 MG: 20 TABLET ORAL at 08:51

## 2022-10-24 NOTE — ASSESSMENT & PLAN NOTE
· CT chest: Incidental finding of thyroid nodule(s)  · nonemergent thyroid ultrasound is recommended

## 2022-10-24 NOTE — PLAN OF CARE
Problem: MOBILITY - ADULT  Goal: Maintain or return to baseline ADL function  Description: INTERVENTIONS:  -  Assess patient's ability to carry out ADLs; assess patient's baseline for ADL function and identify physical deficits which impact ability to perform ADLs (bathing, care of mouth/teeth, toileting, grooming, dressing, etc )  - Assess/evaluate cause of self-care deficits   - Assess range of motion  - Assess patient's mobility; develop plan if impaired  - Assess patient's need for assistive devices and provide as appropriate  - Encourage maximum independence but intervene and supervise when necessary  - Involve family in performance of ADLs  - Assess for home care needs following discharge   - Consider OT consult to assist with ADL evaluation and planning for discharge  - Provide patient education as appropriate  Outcome: Progressing  Goal: Maintains/Returns to pre admission functional level  Description: INTERVENTIONS:  - Perform BMAT or MOVE assessment daily    - Set and communicate daily mobility goal to care team and patient/family/caregiver  - Collaborate with rehabilitation services on mobility goals if consulted  - Perform Range of Motion 2 times a day  - Reposition patient every 2 hours    - Dangle patient 2 times a day  - Stand patient 2 times a day  - Ambulate patient 2 times a day  - Out of bed to chair 2 times a day   - Out of bed for meals 2 times a day  - Out of bed for toileting  - Record patient progress and toleration of activity level   Outcome: Progressing     Problem: Potential for Falls  Goal: Patient will remain free of falls  Description: INTERVENTIONS:  - Educate patient/family on patient safety including physical limitations  - Instruct patient to call for assistance with activity   - Consult OT/PT to assist with strengthening/mobility   - Keep Call bell within reach  - Keep bed low and locked with side rails adjusted as appropriate  - Keep care items and personal belongings within reach  - Initiate and maintain comfort rounds  - Make Fall Risk Sign visible to staff  - Offer Toileting every 2 Hours, in advance of need  - Initiate/Maintain bed alarm  - Obtain necessary fall risk management equipment:   - Apply yellow socks and bracelet for high fall risk patients  - Consider moving patient to room near nurses station  Outcome: Progressing     Problem: Nutrition/Hydration-ADULT  Goal: Nutrient/Hydration intake appropriate for improving, restoring or maintaining nutritional needs  Description: Monitor and assess patient's nutrition/hydration status for malnutrition  Collaborate with interdisciplinary team and initiate plan and interventions as ordered  Monitor patient's weight and dietary intake as ordered or per policy  Utilize nutrition screening tool and intervene as necessary  Determine patient's food preferences and provide high-protein, high-caloric foods as appropriate       INTERVENTIONS:  - Monitor oral intake, urinary output, labs, and treatment plans  - Assess nutrition and hydration status and recommend course of action  - Evaluate amount of meals eaten  - Assist patient with eating if necessary   - Allow adequate time for meals  - Recommend/ encourage appropriate diets, oral nutritional supplements, and vitamin/mineral supplements  - Order, calculate, and assess calorie counts as needed  - Recommend, monitor, and adjust tube feedings and TPN/PPN based on assessed needs  - Assess need for intravenous fluids  - Provide specific nutrition/hydration education as appropriate  - Include patient/family/caregiver in decisions related to nutrition  Outcome: Progressing

## 2022-10-24 NOTE — ED NOTES
Patient transported to [facility] [address] [place] [address], [address] [place] [address]  10/24/22 5420

## 2022-10-24 NOTE — ED NOTES
RN entered pts room after respiratory started breathing treatment and the pt was found with the mask and nasal canula removed before the treatment was finished  The pt stated the treatment was bothering her and made her cough  RN explained the importance of nasal cannula at this time  Pt stated understanding and the nasal cannula was placed back on the pt             Lender Apo  10/24/22 6010

## 2022-10-24 NOTE — ED NOTES
Cardiology at bedside     Rossana Velásquez, [address] [facility]  10/24/22 3570

## 2022-10-24 NOTE — ASSESSMENT & PLAN NOTE
· Maintained on albuterol prn PTA  • CT with bronchitis  No PNA    • S/p IV solu-medrol 125mg x1, continue IV solu-medrol 40mg IV BID  • Nebulizers TID   • Azithromycin 500mg x3d   • IS, respiratory protocol   • pulm consult

## 2022-10-24 NOTE — CONSULTS
Consultation - Cardiology   Zoe Fonseca 78 y o  female MRN: [id number]  Unit/Bed#: ED 12 Encounter: [id number]  10/24/22  10:45 AM    Assessment/ Plan:  1  Nonischemic myocardial injury  • Likely secondary to COPD exacerbation  • Denies chest pain; euvolemic on exam  • Troponin's 222, 315, 312  • EKG reveals NSR  • Echo ordered  • Continue ASA 81 mg, atorvastatin 40 mg, Toprol-XL 50 mg  • Due to gait instability and acute COPD exacerbation she would be a poor candidate for stress testing at this time, however it would be reasonable to consider future stress test as an outpatient     2  Hypertension  Currently 149/68; Continue to monitor  • Continue lisinopril 20 mg and Toprol-XL 50 mg    3  Hyperlipidemia  • Continue atorvastatin 40 mg    4  COPD with acute exacerbation  · Currently on 4 L of oxygen via nasal cannula; wean as tolerated  · Management per Internal Medicine    5  Gait instability  · Recommend outpatient neurology follow-up  · Management per Internal Medicine    History of Present Illness   Physician Requesting Consult: Tal Ware DO    Reason for Consult / Principal Problem:  Elevated troponins    HPI: Meli Mckinley is a 78y o  year old female with history of hypertension, hyperlipidemia, COPD, thyroid nodule and gait instability who presents with increased shortness of breath  SOB has been present for 2 days, that is worse today  Patient endorses increased cough with sputum production  Denies sick contacts, fevers or chills  Denies chest pain, palpitations, lower extremity swelling  Patient also notes chronic gait instability and low back pain      Inpatient consult to Cardiology  Consult performed by: Charissa Coley PA-C  Consult ordered by: Frank Godinez PA-C          EKG:  Normal sinus rhythm; possible left atrial enlargement; incomplete right bundle-branch block; nonspecific ST and T-wave abnormality      Review of Systems   Constitutional: Negative for chills and fever  HENT: Negative for ear pain and sore throat  Eyes: Negative for pain and visual disturbance  Respiratory: Positive for cough (productive), shortness of breath and wheezing  Chest tightness: resloved  Cardiovascular: Negative for chest pain, palpitations and leg swelling  Gastrointestinal: Negative for abdominal pain and vomiting  Genitourinary: Negative for dysuria and hematuria  Musculoskeletal: Positive for back pain (lumbar) and gait problem  Negative for arthralgias  Skin: Negative for color change and rash  Neurological: Negative for seizures and syncope  Psychiatric/Behavioral: Negative for agitation  All other systems reviewed and are negative  Historical Information   Past Medical History:   Diagnosis Date   • COPD (chronic obstructive pulmonary disease) ([facility] [address] )    • Hyperlipemia    • Hypertension      History reviewed  No pertinent surgical history  Social History     Substance and Sexual Activity   Alcohol Use Yes     Social History     Substance and Sexual Activity   Drug Use Never     Social History     Tobacco Use   Smoking Status Current Every Day Smoker   • Packs/day: 1 00   Smokeless Tobacco Never Used       Family History: History reviewed  No pertinent family history  Meds/Allergies   all current active meds have been reviewed  No Known Allergies    Objective   Vitals: Blood pressure 149/68, pulse 65, temperature 98 1 °F (36 7 °C), temperature source Oral, resp  rate 20, height 5' 4 02" (1 626 m), weight 84 kg (185 lb 3 oz), SpO2 96 %  , Body mass index is 31 77 kg/m² ,   Orthostatic Blood Pressures    Flowsheet Row Most Recent Value   Blood Pressure 149/68 filed at 10/24/2022 1015   Patient Position - Orthostatic VS Lying filed at 10/24/2022 8486          Systolic (75YVL), YHX:098 , Min:140 , NWL:728     Diastolic (93UVQ), HDX:58, Min:65, Max:75        Intake/Output Summary (Last 24 hours) at 10/24/2022 1045  Last data filed at 10/24/2022 4365  Gross per 24 hour   Intake --   Output 1600 ml   Net -1600 ml       Invasive Devices  Report    Peripheral Intravenous Line  Duration           Peripheral IV 10/24/22 Right Antecubital <1 day                    Physical Exam:  Physical Exam  Vitals and nursing note reviewed  Constitutional:       General: She is not in acute distress  Appearance: She is well-developed  She is not toxic-appearing or diaphoretic  HENT:      Head: Normocephalic and atraumatic  Nose: Nose normal    Eyes:      Conjunctiva/sclera: Conjunctivae normal    Cardiovascular:      Rate and Rhythm: Normal rate and regular rhythm  Pulses: Normal pulses  Heart sounds: Normal heart sounds  No murmur heard  No friction rub  No gallop  Pulmonary:      Effort: Pulmonary effort is normal  No respiratory distress  Breath sounds: Wheezing present  No rales  Chest:      Chest wall: No tenderness  Abdominal:      Palpations: Abdomen is soft  Tenderness: There is no abdominal tenderness  Musculoskeletal:      Cervical back: Neck supple  Right lower leg: No edema  Left lower leg: No edema  Skin:     General: Skin is warm and dry  Neurological:      Mental Status: She is alert and oriented to person, place, and time     Psychiatric:         Mood and Affect: Mood normal          Judgment: Judgment normal           Lab Results:     Cardiac Profile:   Results from last 7 days   Lab Units 10/24/22  0632 10/24/22  0427 10/24/22  0157   HS TNI 0HR ng/L  --   --  222*   HS TNI 2HR ng/L  --  315*  --    HSTNI D2 ng/L  --  93*  --    HS TNI 4HR ng/L 312*  --   --    HSTNI D4 ng/L 90*  --   --    NT-PRO BNP pg/mL  --   --  782*       CBC with diff:   Results from last 7 days   Lab Units 10/24/22  0157   WBC Thousand/uL 9 57   HEMOGLOBIN g/dL 15 8*   HEMATOCRIT % 47 6*   MCV fL 107*   PLATELETS Thousands/uL 153   MCH pg 35 4*   MCHC g/dL 33 2   RDW % 15 2*   MPV fL 11 3   NRBC AUTO /100 WBCs 0         CMP:   Results from last 7 days   Lab Units 10/24/22  0157   POTASSIUM mmol/L 3 4*   CHLORIDE mmol/L 100   CO2 mmol/L 30   BUN mg/dL 7   CREATININE mg/dL 0 71   CALCIUM mg/dL 8 9   AST U/L 53*   ALT U/L 54   ALK PHOS U/L 115   EGFR ml/min/1 73sq m 88

## 2022-10-24 NOTE — CONSULTS
Consultation - Pulmonary Medicine   Crispin Ros Fonseca 78 y o  female MRN: [id number]  Unit/Bed#: ED 12 Encounter: [id number]      Assessment:  1  Acute hypoxemic respiratory insufficiency  2  COPD unknown severity with acute exacerbation  3  Tobacco abuse    Plan:   Acute hypoxemic respiratory insufficiency secondary to COPD exacerbation in setting of tobacco abuse  She had been requiring up to 6 L nasal cannula, currently on 4 L with sats in the mid 90s  Titrate to maintain O2 sats greater than 88%  CTA on admission shows no PE, shows thickening of the small airways acute versus chronic bronchitis, emphysematous changes  She has no leukocytosis, she is afebrile, flu/RSV/COVID is negative  Continue Solu-Medrol 40 Q 8 hours for today  Continue Xopenex and Atrovent t i d , would benefit from long-acting bronchodilator - will start her on Anoro which should be continued upon discharge  Continue course of azithromycin  Would need outpatient follow-up for PFTs, COPD management, would be a candidate for CT lung screening annually  Smoking cessation, she has cut down from smoking up to 2 packs per day down to half a pack per day  Will continue to follow  History of Present Illness   Physician Requesting Consult: Renata Lofton DO  Reason for Consult / Principal Problem:  COPD exacerbation, respiratory failure  Hx and PE limited by:  None  HPI: Olu Pascual is a 78y o  year old female with close to 100 pack year smoking history with past medical history of COPD, hypertension who presents with complaint of shortness of breath and cough over the past 2 days or so  At baseline she does have some mild chronic cough which she attributes to her smoking and COPD, also has chronic dyspnea on exertion  Over the past 2 days cough has been significantly worse with sputum production, has also been more short of breath  No fever or chills  She is feeling slightly improved from prior to admission      She was told she had COPD many years ago, never had a PFT done  She has albuterol to use as needed but does not use this frequently  She tends to have increased symptoms in the spring but has not needed any steroids or antibiotics in several years  At baseline she does have dyspnea on exertion, does need to take breaks with her daily activities  Does also have a mild chronic cough at baseline  Consults    Review of Systems   Constitutional: Negative  HENT: Negative  Respiratory: Positive for cough and shortness of breath  Cardiovascular: Negative  Gastrointestinal: Negative  Genitourinary: Negative  Musculoskeletal: Negative  Skin: Negative  Allergic/Immunologic: Negative  Neurological: Negative  Psychiatric/Behavioral: Negative  Historical Information   Past Medical History:   Diagnosis Date   • COPD (chronic obstructive pulmonary disease) ([facility] [address] )    • Hyperlipemia    • Hypertension      History reviewed  No pertinent surgical history  Social History   Social History     Substance and Sexual Activity   Alcohol Use Yes     Social History     Substance and Sexual Activity   Drug Use Never     E-Cigarette/Vaping   • E-Cigarette Use Never User      E-Cigarette/Vaping Substances     Social History     Tobacco Use   Smoking Status Current Every Day Smoker   • Packs/day: 1 00   Smokeless Tobacco Never Used     Occupational History:     Family History: History reviewed  No pertinent family history      Meds/Allergies   all current active meds have been reviewed, pertinent pulmonary meds have been reviewed and current meds:   Current Facility-Administered Medications   Medication Dose Route Frequency   • acetaminophen (TYLENOL) tablet 650 mg  650 mg Oral Q6H PRN   • albuterol (PROVENTIL HFA,VENTOLIN HFA) inhaler 2 puff  2 puff Inhalation Q6H PRN   • aspirin (ECOTRIN LOW STRENGTH) EC tablet 81 mg  81 mg Oral Daily   • atorvastatin (LIPITOR) tablet 40 mg  40 mg Oral Daily   • azithromycin [facility]) tablet 500 mg  500 mg Oral Q24H   • enoxaparin (LOVENOX) subcutaneous injection 40 mg  40 mg Subcutaneous Daily   • guaiFENesin (MUCINEX) 12 hr tablet 600 mg  600 mg Oral Q12H MARTHA   • ipratropium (ATROVENT) 0 02 % inhalation solution 0 5 mg  0 5 mg Nebulization TID   • levalbuterol (XOPENEX) inhalation solution 1 25 mg  1 25 mg Nebulization TID   • lisinopril (ZESTRIL) tablet 20 mg  20 mg Oral Daily   • methylPREDNISolone sodium succinate (Solu-MEDROL) injection 40 mg  40 mg Intravenous Q8H   • metoprolol succinate (TOPROL-XL) 24 hr tablet 50 mg  50 mg Oral Daily       No Known Allergies    Objective   Vitals: Blood pressure 149/68, pulse 65, temperature 98 1 °F (36 7 °C), temperature source Oral, resp  rate 20, height 5' 4 02" (1 626 m), weight 84 kg (185 lb 3 oz), SpO2 96 %  ,Body mass index is 31 77 kg/m²  Intake/Output Summary (Last 24 hours) at 10/24/2022 1134  Last data filed at 10/24/2022 2873  Gross per 24 hour   Intake --   Output 1600 ml   Net -1600 ml     Invasive Devices  Report    Peripheral Intravenous Line  Duration           Peripheral IV 10/24/22 Right Antecubital <1 day                Physical Exam  Constitutional:       General: She is not in acute distress  Appearance: Normal appearance  She is not ill-appearing  HENT:      Head: Normocephalic and atraumatic  Mouth/Throat:      Pharynx: Oropharynx is clear  Eyes:      Conjunctiva/sclera: Conjunctivae normal    Cardiovascular:      Rate and Rhythm: Normal rate and regular rhythm  Pulmonary:      Effort: Pulmonary effort is normal  No respiratory distress  Breath sounds: Decreased breath sounds and wheezing present  No rhonchi or rales  Abdominal:      General: Abdomen is flat  There is no distension  Musculoskeletal:         General: Normal range of motion  Cervical back: Normal range of motion  Right lower leg: No edema  Left lower leg: No edema  Skin:     General: Skin is warm and dry  Neurological:      Mental Status: She is alert and oriented to person, place, and time  Psychiatric:         Mood and Affect: Mood normal          Behavior: Behavior normal          Lab Results:   I have personally reviewed pertinent lab results  , CBC:   Lab Results   Component Value Date    WBC 9 57 10/24/2022    HGB 15 8 (H) 10/24/2022    HCT 47 6 (H) 10/24/2022     (H) 10/24/2022     10/24/2022    MCH 35 4 (H) 10/24/2022    MCHC 33 2 10/24/2022    RDW 15 2 (H) 10/24/2022    MPV 11 3 10/24/2022    NRBC 0 10/24/2022   , CMP:   Lab Results   Component Value Date    SODIUM 140 10/24/2022    K 3 4 (L) 10/24/2022     10/24/2022    CO2 30 10/24/2022    BUN 7 10/24/2022    CREATININE 0 71 10/24/2022    CALCIUM 8 9 10/24/2022    AST 53 (H) 10/24/2022    ALT 54 10/24/2022    ALKPHOS 115 10/24/2022    EGFR 88 10/24/2022     Imaging Studies: I have personally reviewed pertinent reports  and I have personally reviewed pertinent films in PACS  EKG, Pathology, and Other Studies: I have personally reviewed pertinent reports      VTE Prophylaxis: Sequential compression device (Venodyne)  and Enoxaparin (Lovenox)    Code Status: Level 1 - Full Code  Advance Directive and Living Will:      Power of :    POLST:

## 2022-10-24 NOTE — ASSESSMENT & PLAN NOTE
Found to be hypoxic in 70% on RA improved to 100% after 2 duonebs and IV solumedrol  · CTA chest: suboptimal to evaluate for peripheral pulmonary emboli, no large pulmonary PE seen  scattered parenchymal and paraseptal emphysematous changes  Thickening of the small airways noted which could represent acute versus chronic bronchitis  No PNA    · Now on 6L, continue supplemental o2, maintain sat >88-92%   · IS

## 2022-10-24 NOTE — ED NOTES
[facility] Yaima Phillips RN  10/24/22 [facility] Clare Swanson RN  10/24/22 [id number]

## 2022-10-24 NOTE — H&P
[facility]  H&P- Aries Nichols Colon 1955, 78 y o  female MRN: [id number]  Unit/Bed#: ED 12 Encounter: [id number]  Primary Care Provider: Ileana Young MD   Date and time admitted to hospital: 10/24/2022  1:38 AM    * Acute respiratory failure with hypoxia [facility])  Assessment & Plan  Found to be hypoxic in 70% on RA improved to 100% after 2 duonebs and IV solumedrol  · CTA chest: suboptimal to evaluate for peripheral pulmonary emboli, no large pulmonary PE seen  scattered parenchymal and paraseptal emphysematous changes  Thickening of the small airways noted which could represent acute versus chronic bronchitis  No PNA  · Now on 6L, continue supplemental o2, maintain sat >88-92%   · IS    COPD with acute exacerbation (HCC)  Assessment & Plan  · Maintained on albuterol prn PTA  • CT with bronchitis  No PNA  • S/p IV solu-medrol 125mg x1, continue IV solu-medrol 40mg IV BID  • Nebulizers TID   • Azithromycin 500mg x3d   • IS, respiratory protocol   • pulm consult     Myocardial injury  Assessment & Plan  Initial trop 222 -> 315 at 2hr   · EKG NSR t wave inversion v1-v2, stable from prior ekg 1yr ago   · likely due to hypoxia/demand ischemia    · Continue to trend   · Cardiology consult     Primary hypertension  Assessment & Plan  · BP acceptable   · Continue home antihypertensives     Thyroid nodule  Assessment & Plan  · CT chest: Incidental finding of thyroid nodule(s)  · nonemergent thyroid ultrasound is recommended  VTE Pharmacologic Prophylaxis: VTE Score: 6 High Risk (Score >/= 5) - Pharmacological DVT Prophylaxis Ordered: enoxaparin (Lovenox)  Sequential Compression Devices Ordered  Code Status: Level 1 - Full Code   Discussion with family: Patient declined call to   Anticipated Length of Stay: Patient will be admitted on an inpatient basis with an anticipated length of stay of greater than 2 midnights secondary to COPD exac      Total Time for Visit, including Counseling / Coordination of Care: 60 minutes Greater than 50% of this total time spent on direct patient counseling and coordination of care  Chief Complaint:   Chief Complaint   Patient presents with   • Shortness of Breath     Pt came in by EMS  Son called 911 b/c pt could not breathe  Hx of COPD  Sats in the 70's on RA  History of Present Illness:  Rere Leger is a 78 y o  female with a PMH of COPD, HTN, HLD who presents with shortness of breath x 2 days, worse today  EMS found patient to be hypoxic to 70%s on RA  She had some "mnior" chest pain with inspiration PTA that is now resolved  Has increased cough with sputum production, difficult to get up  No fever or chills  Denies sick contacts  Reports she uses albuterol prn for her asthma  Tried at home for symptoms without relief  PCP manages her COPD  Review of Systems:  A 10-point review of systems was obtained  Pertinent positives and negatives are outlined in the HPI above  Remainder of review of systems are otherwise negative  Past Medical and Surgical History:   Past Medical History:   Diagnosis Date   • COPD (chronic obstructive pulmonary disease) ([facility] [address] )    • Hyperlipemia    • Hypertension        No past surgical history on file  Meds/Allergies:  Prior to Admission medications    Medication Sig Start Date End Date Taking?  Authorizing Provider   albuterol (PROVENTIL HFA,VENTOLIN HFA) 90 mcg/act inhaler Inhale 2 puffs every 6 (six) hours as needed for wheezing   Yes Historical Provider, MD   amLODIPine (NORVASC) 5 mg tablet Take by mouth daily   Yes Historical Provider, MD   aspirin (ECOTRIN LOW STRENGTH) 81 mg EC tablet Take 81 mg by mouth daily   Yes Historical Provider, MD   atorvastatin (LIPITOR) 40 mg tablet Take 40 mg by mouth daily   Yes Historical Provider, MD   furosemide (LASIX) 20 mg tablet Take 20 mg by mouth daily   Yes Historical Provider, MD   lisinopril (ZESTRIL) 10 mg tablet Take 10 mg by mouth daily Yes Historical Provider, MD   metoprolol succinate (TOPROL-XL) 50 mg 24 hr tablet Take 50 mg by mouth daily   Yes Historical Provider, MD     I have reviewed home medications with patient personally  Allergies: No Known Allergies    Social History:  Marital Status:    Occupation:   Patient Pre-hospital Living Situation: Home  Patient Pre-hospital Level of Mobility: walks  Patient Pre-hospital Diet Restrictions:   Substance Use History:   Social History     Substance and Sexual Activity   Alcohol Use Yes     Social History     Tobacco Use   Smoking Status Current Every Day Smoker   • Packs/day: 0 50   Smokeless Tobacco Never Used     Social History     Substance and Sexual Activity   Drug Use Never       Family History:  Noncontributory     Physical Exam:     Vitals:   Blood Pressure: 165/75 (10/24/22 0558)  Pulse: 73 (10/24/22 0558)  Temperature: 98 1 °F (36 7 °C) (10/24/22 0145)  Temp Source: Oral (10/24/22 0145)  Respirations: (!) 29 (10/24/22 0558)  SpO2: 94 % (10/24/22 0558)    Physical Exam  Vitals and nursing note reviewed  Constitutional:       General: She is not in acute distress  Appearance: She is well-developed  She is ill-appearing  Comments: Speaking in full sentences     HENT:      Head: Normocephalic and atraumatic  Eyes:      General: No scleral icterus  Extraocular Movements: Extraocular movements intact  Conjunctiva/sclera: Conjunctivae normal       Pupils: Pupils are equal, round, and reactive to light  Cardiovascular:      Rate and Rhythm: Normal rate and regular rhythm  Heart sounds: No murmur heard  Pulmonary:      Effort: Pulmonary effort is normal  No respiratory distress  Breath sounds: Wheezing present  Comments: Tight  Abdominal:      Palpations: Abdomen is soft  Tenderness: There is no abdominal tenderness  Musculoskeletal:         General: Normal range of motion  Cervical back: Neck supple     Skin:     General: Skin is warm and dry  Neurological:      Mental Status: She is alert  Mental status is at baseline  Psychiatric:         Mood and Affect: Mood normal           Additional Data:     Lab Results:  Results from last 7 days   Lab Units 10/24/22  0157   WBC Thousand/uL 9 57   HEMOGLOBIN g/dL 15 8*   HEMATOCRIT % 47 6*   PLATELETS Thousands/uL 153   NEUTROS PCT % 71   LYMPHS PCT % 19   MONOS PCT % 6   EOS PCT % 4     Results from last 7 days   Lab Units 10/24/22  0157   SODIUM mmol/L 140   POTASSIUM mmol/L 3 4*   CHLORIDE mmol/L 100   CO2 mmol/L 30   BUN mg/dL 7   CREATININE mg/dL 0 71   ANION GAP mmol/L 10   CALCIUM mg/dL 8 9   ALBUMIN g/dL 3 5   TOTAL BILIRUBIN mg/dL 0 94   ALK PHOS U/L 115   ALT U/L 54   AST U/L 53*   GLUCOSE RANDOM mg/dL 117                       Imaging: Personally reviewed the following imaging: chest CT scan  CTA ED chest PE study   Final Result by Jessica Boudreaux DO (10/24 0422)      Some images are suboptimal secondary to respiratory motion which decreases sensitivity for evaluation of peripheral pulmonary emboli, subject to this, no pulmonary embolism is seen  Scattered parenchymal and paraseptal emphysematous changes  Thickening of the small airways noted which could represent acute versus chronic bronchitis  Correlation with the patient's symptoms and laboratory values recommended  No focal pneumonia is    seen  Incidental thyroid nodule(s) for which nonemergent thyroid ultrasound is recommended  Cardiomegaly, fatty infiltration of the liver, and other findings as above  Workstation performed: [id number]         XR chest 1 view portable    (Results Pending)       EKG and Other Studies Reviewed on Admission:   · EKG: NSR  HR 60  RBBB  T wave inversion v1-v2      ** Please Note: This note has been constructed using a voice recognition system   **

## 2022-10-24 NOTE — RESPIRATORY THERAPY NOTE
RT Protocol Note  Brinda Sebastian Colon 78 y o  female MRN: [id number]  Unit/Bed#: ED 12 Encounter: [id number]    Assessment    Principal Problem:    Acute [facility] failure with [facility] ([facility] )  Active Problems:    Thyroid nodule    COPD with acute exacerbation ([facility] )    Primary hypertension    Myocardial injury      Home Pulmonary Medications:  Albuterol mdi prn       Past Medical History:   Diagnosis Date   • COPD (chronic obstructive pulmonary disease) (HCC)    • Hyperlipemia    • Hypertension      Social History     Socioeconomic History   • Marital status:      Spouse name: Not on file   • Number of children: Not on file   • Years of education: Not on file   • Highest education level: Not on file   Occupational History   • Not on file   Tobacco Use   • Smoking status: Current Every Day Smoker     Packs/day: 0 50   • Smokeless tobacco: Never Used   Vaping Use   • Vaping Use: Never used   Substance and Sexual Activity   • Alcohol use: Yes   • Drug use: Never   • Sexual activity: Not on file   Other Topics Concern   • Not on file   Social History Narrative   • Not on file     Social Determinants of Health     Financial Resource Strain: Not on file   Food Insecurity: Not on file   Transportation Needs: Not on file   Physical Activity: Not on file   Stress: Not on file   Social Connections: Not on file   Intimate Partner Violence: Not on file   Housing Stability: Not on file       Subjective         Objective    Physical Exam:   Assessment Type: Pre-treatment  General Appearance: Alert, Awake  Respiratory Pattern: Dyspnea with exertion  Chest Assessment: Chest expansion symmetrical    Vitals:  Blood pressure 151/65, pulse 60, temperature 98 1 °F (36 7 °C), temperature source Oral, resp  rate 20, height 5' 4 02" (1 626 m), weight 84 kg (185 lb 3 oz), SpO2 96 %  Imaging and other studies: I have personally reviewed pertinent reports              Plan    Respiratory Plan: Mild Distress pathway  Airway Clearance Plan: Incentive Spirometer     Resp Comments: pt awake and alert no disress   tx given

## 2022-10-24 NOTE — ASSESSMENT & PLAN NOTE
Initial trop 222 -> 315 at 2hr   · EKG NSR t wave inversion v1-v2, stable from prior ekg 1yr ago   · likely due to hypoxia/demand ischemia    · Continue to trend   · Cardiology consult

## 2022-10-25 VITALS
TEMPERATURE: 98 F | SYSTOLIC BLOOD PRESSURE: 137 MMHG | DIASTOLIC BLOOD PRESSURE: 58 MMHG | BODY MASS INDEX: 31.58 KG/M2 | HEART RATE: 66 BPM | WEIGHT: 185 LBS | RESPIRATION RATE: 19 BRPM | OXYGEN SATURATION: 94 % | HEIGHT: 64 IN

## 2022-10-25 PROBLEM — Z72.0 TOBACCO ABUSE DISORDER: Status: ACTIVE | Noted: 2022-10-25

## 2022-10-25 LAB
ALBUMIN SERPL BCP-MCNC: 3.4 G/DL (ref 3.5–5)
ALP SERPL-CCNC: 107 U/L (ref 46–116)
ALT SERPL W P-5'-P-CCNC: 47 U/L (ref 12–78)
ANION GAP SERPL CALCULATED.3IONS-SCNC: 8 MMOL/L (ref 4–13)
AST SERPL W P-5'-P-CCNC: 35 U/L (ref 5–45)
BASOPHILS # BLD AUTO: 0.01 THOUSANDS/ÂΜL (ref 0–0.1)
BASOPHILS NFR BLD AUTO: 0 % (ref 0–1)
BILIRUB SERPL-MCNC: 1.43 MG/DL (ref 0.2–1)
BUN SERPL-MCNC: 15 MG/DL (ref 5–25)
CALCIUM ALBUM COR SERPL-MCNC: 10 MG/DL (ref 8.3–10.1)
CALCIUM SERPL-MCNC: 9.5 MG/DL (ref 8.3–10.1)
CHLORIDE SERPL-SCNC: 99 MMOL/L (ref 96–108)
CO2 SERPL-SCNC: 32 MMOL/L (ref 21–32)
CREAT SERPL-MCNC: 0.63 MG/DL (ref 0.6–1.3)
DME PARACHUTE DELIVERY DATE REQUESTED: NORMAL
DME PARACHUTE DELIVERY NOTE: NORMAL
DME PARACHUTE ITEM DESCRIPTION: NORMAL
DME PARACHUTE ORDER STATUS: NORMAL
DME PARACHUTE SUPPLIER NAME: NORMAL
DME PARACHUTE SUPPLIER PHONE: NORMAL
EOSINOPHIL # BLD AUTO: 0.01 THOUSAND/ÂΜL (ref 0–0.61)
EOSINOPHIL NFR BLD AUTO: 0 % (ref 0–6)
ERYTHROCYTE [DISTWIDTH] IN BLOOD BY AUTOMATED COUNT: 15.2 % (ref 11.6–15.1)
GFR SERPL CREATININE-BSD FRML MDRD: 93 ML/MIN/1.73SQ M
GLUCOSE SERPL-MCNC: 189 MG/DL (ref 65–140)
HCT VFR BLD AUTO: 47.3 % (ref 34.8–46.1)
HGB BLD-MCNC: 15.7 G/DL (ref 11.5–15.4)
IMM GRANULOCYTES # BLD AUTO: 0.06 THOUSAND/UL (ref 0–0.2)
IMM GRANULOCYTES NFR BLD AUTO: 1 % (ref 0–2)
LYMPHOCYTES # BLD AUTO: 0.64 THOUSANDS/ÂΜL (ref 0.6–4.47)
LYMPHOCYTES NFR BLD AUTO: 5 % (ref 14–44)
MCH RBC QN AUTO: 35.5 PG (ref 26.8–34.3)
MCHC RBC AUTO-ENTMCNC: 33.2 G/DL (ref 31.4–37.4)
MCV RBC AUTO: 107 FL (ref 82–98)
MONOCYTES # BLD AUTO: 0.45 THOUSAND/ÂΜL (ref 0.17–1.22)
MONOCYTES NFR BLD AUTO: 4 % (ref 4–12)
NEUTROPHILS # BLD AUTO: 11.1 THOUSANDS/ÂΜL (ref 1.85–7.62)
NEUTS SEG NFR BLD AUTO: 90 % (ref 43–75)
NRBC BLD AUTO-RTO: 0 /100 WBCS
PLATELET # BLD AUTO: 166 THOUSANDS/UL (ref 149–390)
PMV BLD AUTO: 12.1 FL (ref 8.9–12.7)
POTASSIUM SERPL-SCNC: 4.3 MMOL/L (ref 3.5–5.3)
PROT SERPL-MCNC: 8.2 G/DL (ref 6.4–8.4)
RBC # BLD AUTO: 4.42 MILLION/UL (ref 3.81–5.12)
SODIUM SERPL-SCNC: 139 MMOL/L (ref 135–147)
WBC # BLD AUTO: 12.27 THOUSAND/UL (ref 4.31–10.16)

## 2022-10-25 PROCEDURE — 99232 SBSQ HOSP IP/OBS MODERATE 35: CPT | Performed by: INTERNAL MEDICINE

## 2022-10-25 PROCEDURE — 94760 N-INVAS EAR/PLS OXIMETRY 1: CPT

## 2022-10-25 PROCEDURE — 99239 HOSP IP/OBS DSCHRG MGMT >30: CPT | Performed by: INTERNAL MEDICINE

## 2022-10-25 PROCEDURE — 99406 BEHAV CHNG SMOKING 3-10 MIN: CPT | Performed by: INTERNAL MEDICINE

## 2022-10-25 PROCEDURE — 94640 AIRWAY INHALATION TREATMENT: CPT

## 2022-10-25 PROCEDURE — 80053 COMPREHEN METABOLIC PANEL: CPT | Performed by: FAMILY MEDICINE

## 2022-10-25 PROCEDURE — 85025 COMPLETE CBC W/AUTO DIFF WBC: CPT | Performed by: FAMILY MEDICINE

## 2022-10-25 RX ORDER — LISINOPRIL 20 MG/1
20 TABLET ORAL DAILY
Qty: 30 TABLET | Refills: 0 | Status: SHIPPED | OUTPATIENT
Start: 2022-10-26

## 2022-10-25 RX ORDER — GUAIFENESIN 600 MG/1
600 TABLET, EXTENDED RELEASE ORAL EVERY 12 HOURS SCHEDULED
Qty: 14 TABLET | Refills: 0 | Status: SHIPPED | OUTPATIENT
Start: 2022-10-25

## 2022-10-25 RX ORDER — LEVALBUTEROL 1.25 MG/.5ML
1.25 SOLUTION, CONCENTRATE RESPIRATORY (INHALATION)
Qty: 90 EACH | Refills: 0 | Status: SHIPPED | OUTPATIENT
Start: 2022-10-25

## 2022-10-25 RX ORDER — AZITHROMYCIN 500 MG/1
500 TABLET, FILM COATED ORAL EVERY 24 HOURS
Qty: 4 TABLET | Refills: 0 | Status: SHIPPED | OUTPATIENT
Start: 2022-10-25 | End: 2022-11-02

## 2022-10-25 RX ORDER — PREDNISONE 10 MG/1
TABLET ORAL
Qty: 63 TABLET | Refills: 0 | Status: SHIPPED | OUTPATIENT
Start: 2022-10-25 | End: 2022-11-02

## 2022-10-25 RX ORDER — AZITHROMYCIN 500 MG/1
500 TABLET, FILM COATED ORAL EVERY 24 HOURS
Qty: 1 TABLET | Refills: 0 | Status: SHIPPED | OUTPATIENT
Start: 2022-10-25 | End: 2022-10-25 | Stop reason: SDUPTHER

## 2022-10-25 RX ADMIN — METHYLPREDNISOLONE SODIUM SUCCINATE 40 MG: 40 INJECTION, POWDER, FOR SOLUTION INTRAMUSCULAR; INTRAVENOUS at 09:04

## 2022-10-25 RX ADMIN — IPRATROPIUM BROMIDE 0.5 MG: 0.5 SOLUTION RESPIRATORY (INHALATION) at 07:14

## 2022-10-25 RX ADMIN — LEVALBUTEROL HYDROCHLORIDE 1.25 MG: 1.25 SOLUTION, CONCENTRATE RESPIRATORY (INHALATION) at 07:14

## 2022-10-25 RX ADMIN — UMECLIDINIUM BROMIDE AND VILANTEROL TRIFENATATE 1 PUFF: 62.5; 25 POWDER RESPIRATORY (INHALATION) at 08:31

## 2022-10-25 RX ADMIN — LISINOPRIL 20 MG: 20 TABLET ORAL at 08:19

## 2022-10-25 RX ADMIN — GUAIFENESIN 600 MG: 600 TABLET ORAL at 08:19

## 2022-10-25 RX ADMIN — ATORVASTATIN CALCIUM 40 MG: 40 TABLET, FILM COATED ORAL at 08:19

## 2022-10-25 RX ADMIN — METHYLPREDNISOLONE SODIUM SUCCINATE 40 MG: 40 INJECTION, POWDER, FOR SOLUTION INTRAMUSCULAR; INTRAVENOUS at 01:50

## 2022-10-25 RX ADMIN — METOPROLOL SUCCINATE 50 MG: 50 TABLET, EXTENDED RELEASE ORAL at 08:19

## 2022-10-25 RX ADMIN — ASPIRIN 81 MG: 81 TABLET, COATED ORAL at 08:19

## 2022-10-25 RX ADMIN — AZITHROMYCIN 500 MG: 500 TABLET, FILM COATED ORAL at 05:32

## 2022-10-25 RX ADMIN — ENOXAPARIN SODIUM 40 MG: 40 INJECTION SUBCUTANEOUS at 08:18

## 2022-10-25 NOTE — CASE MANAGEMENT
Case Management Discharge Planning Note    Patient name Aki Tate Colon  Location /-01 MRN [id number]  : 1955 Date 10/25/2022       Current Admission Date: 10/24/2022  Current Admission Diagnosis:Acute respiratory failure with hypoxia [facility])   Patient Active Problem List    Diagnosis Date Noted   • Acute respiratory failure with hypoxia ([facility] [address] ) 10/24/2022   • Thyroid nodule 10/24/2022   • COPD with acute exacerbation ([facility] [address] ) 10/24/2022   • Primary hypertension 10/24/2022   • Myocardial injury 10/24/2022      LOS (days): 1  Geometric Mean LOS (GMLOS) (days): 3 40  Days to GMLOS:2 1     OBJECTIVE:  Risk of Unplanned Readmission Score: 10 54         Current admission status: Inpatient   Preferred Pharmacy:   [address] [place] [address] [address] [facility]  Phone: [phone] Fax: [phone]    Primary Care Provider: Anni Bryant MD    Primary Insurance: MEDICARE  Secondary Insurance:     DISCHARGE DETAILS:                                          Other Referral/Resources/Interventions Provided:  Referral Comments: Nebulizer delivered to patient

## 2022-10-25 NOTE — ASSESSMENT & PLAN NOTE
Initial trop 222 -> 315 at 2hr   · EKG NSR t wave inversion v1-v2, stable from prior ekg 1yr ago   · likely due to hypoxia/demand ischemia    · Ruled out [facility]-cardiology signed off

## 2022-10-25 NOTE — ASSESSMENT & PLAN NOTE
· Maintained on albuterol prn PTA  • CT with bronchitis  No PNA  • Nebulizers TID; prescribed nebulizer on discharge  • Started on Anoro  • Azithromycin 500mg x7d  • IS, respiratory protocol   • Pulmonology input appreciated-Solu-Medrol converted to p o   Prednisone 60 mg, decreasing by 10 mg every 3 days

## 2022-10-25 NOTE — DISCHARGE SUMMARY
[facility]  Discharge- Radha Lozano Colon 1955, 78 y o  female MRN: [id number]  Unit/Bed#: -01 Encounter: [id number]  Primary Care Provider: Olga Ferguson MD   Date and time admitted to hospital: 10/24/2022  1:38 AM    * Acute respiratory failure with hypoxia [facility])  Assessment & Plan  Found to be hypoxic in 70% on RA improved to 100% after 2 duonebs and IV solumedrol ; now resolved  · CTA chest: suboptimal to evaluate for peripheral pulmonary emboli, no large pulmonary PE seen  scattered parenchymal and paraseptal emphysematous changes  Thickening of the small airways noted which could represent acute versus chronic bronchitis  No PNA  · Now on room air saturating well while seated  · Ambulatory pulse ox check per nursing showed no significant decrease  · Recommend incentive spirometry  · P o  Steroid taper on discharge    Tobacco abuse disorder  Assessment & Plan  Smoking cessation discussed with patient for 3-10 minutes    Myocardial injury  Assessment & Plan  Initial trop 222 -> 315 at 2hr   · EKG NSR t wave inversion v1-v2, stable from prior ekg 1yr ago   · likely due to hypoxia/demand ischemia    · Ruled out ACS-cardiology signed off    Primary hypertension  Assessment & Plan  · BP acceptable   · Continue home antihypertensives     COPD with acute exacerbation (HCC)  Assessment & Plan  · Maintained on albuterol prn PTA  • CT with bronchitis  No PNA  • Nebulizers TID; prescribed nebulizer on discharge  • Started on Anoro  • Azithromycin 500mg x7d  • IS, respiratory protocol   • Pulmonology input appreciated-Solu-Medrol converted to p o  Prednisone 60 mg, decreasing by 10 mg every 3 days    Thyroid nodule  Assessment & Plan  · CT chest: Incidental finding of thyroid nodule(s)  · nonemergent thyroid ultrasound is recommended      Medical Problems             Resolved Problems  Date Reviewed: 10/25/2022   None               Discharging Physician / Practitioner: Edy Krause [address] [facility] [address]  PCP: Cayr Allan MD  Admission Date:   Admission Orders (From admission, onward)     Ordered        10/24/22 0613  INPATIENT ADMISSION  Once                      Discharge Date: 10/25/22    Consultations During Hospital Stay:  · Pulmonology  · Cardiology    Procedures Performed:   · None    Significant Findings / Test Results:   XR chest 1 view portable    Result Date: 10/24/2022  Impression: Cardiomegaly  Mild vascular congestion Workstation performed: [id number]     CTA ED chest PE study    Result Date: 10/24/2022  Impression: Some images are suboptimal secondary to respiratory motion which decreases sensitivity for evaluation of peripheral pulmonary emboli, subject to this, no pulmonary embolism is seen  Scattered parenchymal and paraseptal emphysematous changes  Thickening of the small airways noted which could represent acute versus chronic bronchitis  Correlation with the patient's symptoms and laboratory values recommended  No focal pneumonia is seen  Incidental thyroid nodule(s) for which nonemergent thyroid ultrasound is recommended  Cardiomegaly, fatty infiltration of the liver, and other findings as above  Workstation performed: [id number]       XR chest 1 view portable    Result Date: 10/24/2022  Impression Cardiomegaly  Mild vascular congestion Workstation performed: [id number]      Results for orders placed during the hospital encounter of 10/24/22    Echo complete w/ contrast if indicated    Interpretation Summary  •  Left Ventricle: Left ventricular cavity size is normal  Wall thickness is normal  The left ventricular ejection fraction is 60%  Systolic function is normal  Wall motion is normal  Diastolic function is mildly abnormal, consistent with grade I (abnormal) relaxation  •  Right Ventricle: Right ventricular cavity size is normal  Systolic function is normal   •  Left Atrium: The atrium is mildly dilated  •  Tricuspid Valve: There is mild regurgitation    • Aorta: The aortic root is normal in size  The ascending aorta is mildly dilated with maximum anteroposterior diameter of 39 mm  •  Pulmonary Artery: The estimated pulmonary artery systolic pressure is 54 5 mmHg  The pulmonary artery systolic pressure is mildly increased  No results for input(s): [place] Quivers, GRAMSTAIN, URINECX, WOUNDCULT, BODYFLUIDCUL, MRSACULTURE, INFLUAPCR, INFLUBPCR, RSVPCR, LEGIONELLAUR, STPU, CDIFFTOXINB in the last 72 hours  Incidental Findings:   · Incidental finding of thyroid nodule-recommend thyroid ultrasound     Test Results Pending at Discharge (will require follow up): · None     Outpatient Tests Requested:  · None    Complications:  None    Reason for Admission:   Chief Complaint   Patient presents with   • Shortness of Breath     Pt came in by EMS  Son called 911 b/c pt could not breathe  Hx of COPD  Sats in the 70's on RA  Hospital Course:   Rere Leger is a 78 y o  female patient who originally presented to the hospital on 10/24/2022 due to COPD exacerbation-shortness of breath with productive cough for 2 days prior to admission  Recently had URI symptoms a week prior to admission  She noted that she was saturating in the 70s on arrival to the ED and was placed on 6 L of nasal cannula which was subsequently weaned down to 3 L  Pulmonology was consulted due to concern for COPD exacerbation  Patient was given side effect Solu-Medrol which helped patient recover significantly  Oxygen was weaned off and patient was able to be saturating well on room air at the time of admission  Patient was eager to go home and wished to be discharged despite being on IV steroids    After discussion with pulmonology, given that she is no longer on supplemental oxygen and put ambulatory pulse ox showed no additional requirements for oxygen, patient was cleared for discharge on steroid taper starting at 60 mg of prednisone to be taken daily, weaning down by 10 mg every 3 days  In addition, patient was encouraged to follow-up with outpatient pulmonologist for further testing such as PFT so that they could better quantify the severity of COPD  Patient was also discharged on azithromycin along with Xopenex Atrovent for which patient was also given a prescription for nebulizer  In addition, patient was also started on Anoro per pulmonology recommendations  Patient was eager to go home and all questions were answered  Please see above list of diagnoses and related plan for additional information  Condition at Discharge: stable    Discharge Day Visit / Exam:   Subjective:  Yoel Bellis to go home today  All questions answered  No complaints  No chest pain, shortness of breath, abdominal pain, nausea, vomiting  Vitals: Blood Pressure: 137/58 (10/25/22 0818)  Pulse: 66 (10/25/22 0818)  Temperature: 98 °F (36 7 °C) (10/25/22 0300)  Temp Source: Oral (10/24/22 0145)  Respirations: 19 (10/24/22 2147)  Height: 5' 4" (162 6 cm) (10/24/22 1154)  Weight - Scale: 83 9 kg (185 lb) (10/24/22 1154)  SpO2: 94 % (10/25/22 0900)  Exam:   Physical Exam  Vitals and nursing note reviewed  Constitutional:       General: She is not in acute distress  Appearance: She is well-developed  She is not diaphoretic  Interventions: She is not intubated  HENT:      Head: Normocephalic and atraumatic  Nose: Nose normal    Eyes:      General: No scleral icterus  Conjunctiva/sclera: Conjunctivae normal    Neck:      Trachea: No tracheal deviation  Cardiovascular:      Rate and Rhythm: Normal rate and regular rhythm  Pulses: Normal pulses  Radial pulses are 2+ on the right side and 2+ on the left side  Dorsalis pedis pulses are 2+ on the right side and 2+ on the left side  Heart sounds: Normal heart sounds, S1 normal and S2 normal  No murmur heard  No friction rub  No gallop  Pulmonary:      Effort: Pulmonary effort is normal  No respiratory distress  She is not intubated  Breath sounds: Normal breath sounds  No stridor  No decreased breath sounds, wheezing or rales  Chest:      Chest wall: No tenderness  Abdominal:      General: Bowel sounds are normal  There is no distension  Palpations: Abdomen is soft  There is no mass  Tenderness: There is no abdominal tenderness  Musculoskeletal:         General: No tenderness or deformity  Lymphadenopathy:      Cervical: No cervical adenopathy  Skin:     General: Skin is warm and dry  Coloration: Skin is not pale  Findings: No erythema  Neurological:      Mental Status: She is alert and oriented to person, place, and time  Psychiatric:         Mood and Affect: Mood normal          Speech: Speech normal          Behavior: Behavior normal          Thought Content: Thought content normal          Judgment: Judgment normal           Discussion with Family: Patient declined call to   Discharge instructions/Information to patient and family:   See after visit summary for information provided to patient and family  Provisions for Follow-Up Care:  See after visit summary for information related to follow-up care and any pertinent home health orders  Disposition:   Home    Planned Readmission:  None none     Discharge Statement:  I spent 39 minutes discharging the patient  This time was spent on the day of discharge  I had direct contact with the patient on the day of discharge  Greater than 50% of the total time was spent examining patient, answering all patient questions, arranging and discussing plan of care with patient as well as directly providing post-discharge instructions  Additional time then spent on discharge activities  Discharge Medications:  See after visit summary for reconciled discharge medications provided to patient and/or family        **Please Note: This note may have been constructed using a voice recognition system**

## 2022-10-25 NOTE — RESPIRATORY THERAPY NOTE
RT Protocol Note  Aki Tate Colon 78 y o  female MRN: [id number]  Unit/Bed#: -01 Encounter: [id number]    Assessment    Principal Problem:    Acute respiratory failure with hypoxia ([facility] )  Active Problems:    Thyroid nodule    COPD with acute exacerbation ([facility] )    Primary hypertension    Myocardial injury      Home Pulmonary Medications:     10/24/22 2100   Incentive Spirometry Tx   IS level of assistance Assisted by respiratory care provider   Frequency q1hr W/A   Respiratory Effort Normal   Treatment Tolerance Tolerated well   Incentive Spirometry Goal (mL) 1000 mL   Incentive Spirometry Achieved (mL) 1250 mL   Chest Physiotherapy Tx   Cough None          Past Medical History:   Diagnosis Date    COPD (chronic obstructive pulmonary disease) ([place] [address] )     Hyperlipemia     Hypertension      Social History     Socioeconomic History    Marital status:       Spouse name: None    Number of children: None    Years of education: None    Highest education level: None   Occupational History    None   Tobacco Use    Smoking status: Current Every Day Smoker     Packs/day: 1 00    Smokeless tobacco: Never Used   Vaping Use    Vaping Use: Never used   Substance and Sexual Activity    Alcohol use: Yes    Drug use: Never    Sexual activity: None   Other Topics Concern    None   Social History Narrative    None     Social Determinants of Health     Financial Resource Strain: Not on file   Food Insecurity: Not on file   Transportation Needs: Not on file   Physical Activity: Not on file   Stress: Not on file   Social Connections: Not on file   Intimate Partner Violence: Not on file   Housing Stability: Not on file       Subjective         Objective    Physical Exam:   Assessment Type: During-treatment  General Appearance: Alert, Awake  Respiratory Pattern: Normal  Chest Assessment: Chest expansion symmetrical  Bilateral Breath Sounds: Diminished  R Breath Sounds: Diminished  L Breath Sounds: Diminished  Cough: None    Vitals:  Blood pressure 145/64, pulse (!) 53, temperature 98 4 °F (36 9 °C), resp  rate 18, height 5' 4" (1 626 m), weight 83 9 kg (185 lb), SpO2 94 %  Imaging and other studies: I have personally reviewed pertinent reports              Plan    Respiratory Plan: Home Bronchodilator Patient pathway  Airway Clearance Plan: Incentive Spirometer     Resp Comments: resp protocol completed

## 2022-10-25 NOTE — PLAN OF CARE
Problem: MOBILITY - ADULT  Goal: Maintain or return to baseline ADL function  Description: INTERVENTIONS:  -  Assess patient's ability to carry out ADLs; assess patient's baseline for ADL function and identify physical deficits which impact ability to perform ADLs (bathing, care of mouth/teeth, toileting, grooming, dressing, etc )  - Assess/evaluate cause of self-care deficits   - Assess range of motion  - Assess patient's mobility; develop plan if impaired  - Assess patient's need for assistive devices and provide as appropriate  - Encourage maximum independence but intervene and supervise when necessary  - Involve family in performance of ADLs  - Assess for home care needs following discharge   - Consider OT consult to assist with ADL evaluation and planning for discharge  - Provide patient education as appropriate  10/25/2022 1137 by Vivi Marie LPN  Outcome: Adequate for Discharge  10/25/2022 1136 by Vivi Marie LPN  Outcome: Progressing  Goal: Maintains/Returns to pre admission functional level  Description: INTERVENTIONS:  - Perform BMAT or MOVE assessment daily    - Set and communicate daily mobility goal to care team and patient/family/caregiver  - Collaborate with rehabilitation services on mobility goals if consulted  - Perform Range of Motion 2 times a day  - Reposition patient every 2 hours    - Dangle patient 2 times a day  - Stand patient 2 times a day  - Ambulate patient 2 times a day  - Out of bed to chair 2 times a day   - Out of bed for meals 2 times a day  - Out of bed for toileting  - Record patient progress and toleration of activity level   10/25/2022 1137 by Vivi Marie LPN  Outcome: Adequate for Discharge  10/25/2022 1136 by Vivi Marie LPN  Outcome: Progressing     Problem: Potential for Falls  Goal: Patient will remain free of falls  Description: INTERVENTIONS:  - Educate patient/family on patient safety including physical limitations  - Instruct patient to call for assistance with activity   - Consult OT/PT to assist with strengthening/mobility   - Keep Call bell within reach  - Keep bed low and locked with side rails adjusted as appropriate  - Keep care items and personal belongings within reach  - Initiate and maintain comfort rounds  - Make Fall Risk Sign visible to staff  - Offer Toileting every 2 Hours, in advance of need  - Initiate/Maintain bed alarm  - Obtain necessary fall risk management equipment:   - Apply yellow socks and bracelet for high fall risk patients  - Consider moving patient to room near nurses station  10/25/2022 1137 by Aubree Berger LPN  Outcome: Adequate for Discharge  10/25/2022 1136 by Aubree Berger LPN  Outcome: Progressing     Problem: Nutrition/Hydration-ADULT  Goal: Nutrient/Hydration intake appropriate for improving, restoring or maintaining nutritional needs  Description: Monitor and assess patient's nutrition/hydration status for malnutrition  Collaborate with interdisciplinary team and initiate plan and interventions as ordered  Monitor patient's weight and dietary intake as ordered or per policy  Utilize nutrition screening tool and intervene as necessary  Determine patient's food preferences and provide high-protein, high-caloric foods as appropriate       INTERVENTIONS:  - Monitor oral intake, urinary output, labs, and treatment plans  - Assess nutrition and hydration status and recommend course of action  - Evaluate amount of meals eaten  - Assist patient with eating if necessary   - Allow adequate time for meals  - Recommend/ encourage appropriate diets, oral nutritional supplements, and vitamin/mineral supplements  - Order, calculate, and assess calorie counts as needed  - Recommend, monitor, and adjust tube feedings and TPN/PPN based on assessed needs  - Assess need for intravenous fluids  - Provide specific nutrition/hydration education as appropriate  - Include patient/family/caregiver in decisions related to nutrition  10/25/2022 1137 by Brenna High LPN  Outcome: Adequate for Discharge  10/25/2022 1136 by Brenna High LPN  Outcome: Progressing

## 2022-10-25 NOTE — ASSESSMENT & PLAN NOTE
Found to be hypoxic in 70% on RA improved to 100% after 2 duonebs and IV solumedrol ; now resolved  · CTA chest: suboptimal to evaluate for peripheral pulmonary emboli, no large pulmonary PE seen  scattered parenchymal and paraseptal emphysematous changes  Thickening of the small airways noted which could represent acute versus chronic bronchitis  No PNA  · Now on room air saturating well while seated  · Ambulatory pulse ox check per nursing showed no significant decrease  · Recommend incentive spirometry  · P o   Steroid taper on discharge

## 2022-10-25 NOTE — PLAN OF CARE
Problem: Potential for Falls  Goal: Patient will remain free of falls  Description: INTERVENTIONS:  - Educate patient/family on patient safety including physical limitations  - Instruct patient to call for assistance with activity   - Consult OT/PT to assist with strengthening/mobility   - Keep Call bell within reach  - Keep bed low and locked with side rails adjusted as appropriate  - Keep care items and personal belongings within reach  - Initiate and maintain comfort rounds  - Make Fall Risk Sign visible to staff  - Offer Toileting every 2 Hours, in advance of need  - Initiate/Maintain bed/chair alarm  - Obtain necessary fall risk management equipment: walker  - Apply yellow socks and bracelet for high fall risk patients  - Consider moving patient to room near nurses station  Outcome: Progressing

## 2022-10-25 NOTE — ASSESSMENT & PLAN NOTE
· BP acceptable   · Continue home antihypertensives Tazorac Pregnancy And Lactation Text: This medication is not safe during pregnancy. It is unknown if this medication is excreted in breast milk.

## 2022-10-25 NOTE — CASE MANAGEMENT
Case Management Discharge Planning Note    Patient name Jordon Huizar Colon  Location /-01 MRN [id number]  : 1955 Date 10/25/2022       Current Admission Date: 10/24/2022  Current Admission Diagnosis:Acute respiratory failure with hypoxia [facility])   Patient Active Problem List    Diagnosis Date Noted   • Acute respiratory failure with hypoxia ([facility] ) 10/24/2022   • Thyroid nodule 10/24/2022   • COPD with acute exacerbation ([facility] ) 10/24/2022   • Primary hypertension 10/24/2022   • Myocardial injury 10/24/2022      LOS (days): 1  Geometric Mean LOS (GMLOS) (days): 3 40  Days to GMLOS:2 2     OBJECTIVE:  Risk of Unplanned Readmission Score: 9 72         Current admission status: Inpatient   Preferred Pharmacy:   [address] [place] [address] - [facility] [address]  Phone: [phone] Fax: [phone]    Primary Care Provider: Jeanna Aguiar MD    Primary Insurance: MEDICARE  Secondary Insurance:     DISCHARGE DETAILS:                                          Other Referral/Resources/Interventions Provided:  Referral Comments: Nebulizer ordered through [facility]

## 2022-10-25 NOTE — NURSING NOTE
Patient left ambulatory  IV removed  John removed  All questions answered  AVS reviewed  No needs at discharge  Patient left with nebulizer

## 2022-10-25 NOTE — PROGRESS NOTES
Progress Note - Pulmonary   Jorge Nation Colon 78 y o  female MRN: [id number]  Unit/Bed#: -01 Encounter: [id number]    Assessment:  1  Acute hypoxemic respiratory insufficiency, improved  2  COPD unknown severity with acute exacerbation  3  Tobacco abuse    Plan:  Acute hypoxemic respiratory insufficiency secondary COPD exacerbation, improved  She is currently on room air at rest with sats in the mid 90s  Will have ambulatory sats prior to discharge  Patient is eager to be discharged home, feeling much improved  Would transition her to prednisone 60 mg and decrease by 10 mg every 3 days, complete 5 day course of azithromycin  Would continue Anoro upon discharge as well as albuterol 4 times per day as needed  She does not have a nebulizer at home, she is worried about the cost   Will have Case Management speak with her about out of pocket cost  Smoking cessation  Will need follow-up for PFTs, COPD management, CT lung screening annually  She can with us in the office in 1-2 weeks    Subjective:   Patient resting in bed  She is feeling much better, breathing easier  Objective:     Vitals: Blood pressure 137/58, pulse 66, temperature 98 °F (36 7 °C), resp  rate 19, height 5' 4" (1 626 m), weight 83 9 kg (185 lb), SpO2 94 %  ,Body mass index is 31 76 kg/m²        Intake/Output Summary (Last 24 hours) at 10/25/2022 1057  Last data filed at 10/25/2022 0900  Gross per 24 hour   Intake 120 ml   Output --   Net 120 ml       Invasive Devices  Report    Peripheral Intravenous Line  Duration           Peripheral IV 10/25/22 Left;Ventral (anterior) Forearm <1 day                Physical Exam: /58   Pulse 66   Temp 98 °F (36 7 °C)   Resp 19   Ht 5' 4" (1 626 m)   Wt 83 9 kg (185 lb)   SpO2 94%   BMI 31 76 kg/m²   General appearance: alert and oriented, in no acute distress  Head: Normocephalic, without obvious abnormality, atraumatic  Eyes: negative findings: conjunctivae and sclerae normal  Lungs: Improved air movement, occasional wheeze  Heart: regular rate and rhythm  Abdomen: normal findings: soft, non-tender  Extremities: No edema  Skin: Warm and dry  Neurologic: Mental status: Alert, oriented, thought content appropriate     Labs: I have personally reviewed pertinent lab results  , CBC:   Lab Results   Component Value Date    WBC 12 27 (H) 10/25/2022    HGB 15 7 (H) 10/25/2022    HCT 47 3 (H) 10/25/2022     (H) 10/25/2022     10/25/2022    MCH 35 5 (H) 10/25/2022    MCHC 33 2 10/25/2022    RDW 15 2 (H) 10/25/2022    MPV 12 1 10/25/2022    NRBC 0 10/25/2022   , CMP:   Lab Results   Component Value Date    SODIUM 139 10/25/2022    K 4 3 10/25/2022    CL 99 10/25/2022    CO2 32 10/25/2022    BUN 15 10/25/2022    CREATININE 0 63 10/25/2022    CALCIUM 9 5 10/25/2022    AST 35 10/25/2022    ALT 47 10/25/2022    ALKPHOS 107 10/25/2022    EGFR 93 10/25/2022     Imaging and other studies: I have personally reviewed pertinent reports     and I have personally reviewed pertinent films in PACS

## 2022-10-25 NOTE — RESPIRATORY THERAPY NOTE
RT Protocol Note  Renu Cabrera Colon 78 y o  female MRN: [id number]  Unit/Bed#: -01 Encounter: [id number]    Assessment    Principal Problem:    Acute respiratory failure with hypoxia [facility])  Active Problems:    Thyroid nodule    COPD with acute exacerbation ([facility] [address] )    Primary hypertension    Myocardial injury      Home Pulmonary Medications:     10/24/22 2100   Respiratory Protocol   Protocol Initiated? Yes   Protocol Selection Respiratory; Airway Clearance   Language Barrier? No   Medical & Social History Reviewed? Yes   Diagnostic Studies Reviewed? Yes   Physical Assessment Performed? Yes   Respiratory Plan Home Bronchodilator Patient pathway   Airway Clearance Plan Incentive Spirometer   Respiratory Assessment   Assessment Type During-treatment   General Appearance Alert; Awake   Respiratory Pattern Normal   Chest Assessment Chest expansion symmetrical   Bilateral Breath Sounds Diminished   R Breath Sounds Diminished   L Breath Sounds Diminished   Cough None   Resp Comments resp protocol completed   Additional Assessments   Pulse (!) 53   Respirations 18   SpO2 94 %          Past Medical History:   Diagnosis Date    COPD (chronic obstructive pulmonary disease) (HCC)     Hyperlipemia     Hypertension      Social History     Socioeconomic History    Marital status:       Spouse name: None    Number of children: None    Years of education: None    Highest education level: None   Occupational History    None   Tobacco Use    Smoking status: Current Every Day Smoker     Packs/day: 1 00    Smokeless tobacco: Never Used   Vaping Use    Vaping Use: Never used   Substance and Sexual Activity    Alcohol use: Yes    Drug use: Never    Sexual activity: None   Other Topics Concern    None   Social History Narrative    None     Social Determinants of Health     Financial Resource Strain: Not on file   Food Insecurity: Not on file   Transportation Needs: Not on file   Physical Activity: Not on file   Stress: Not on file   Social Connections: Not on file   Intimate Partner Violence: Not on file   Housing Stability: Not on file       Subjective         Objective    Physical Exam:   Assessment Type: During-treatment  General Appearance: Alert, Awake  Respiratory Pattern: Normal  Chest Assessment: Chest expansion symmetrical  Bilateral Breath Sounds: Diminished  R Breath Sounds: Diminished  L Breath Sounds: Diminished  Cough: None    Vitals:  Blood pressure 145/64, pulse (!) 53, temperature 98 4 °F (36 9 °C), resp  rate 18, height 5' 4" (1 626 m), weight 83 9 kg (185 lb), SpO2 94 %  Imaging and other studies: I have personally reviewed pertinent reports              Plan    Respiratory Plan: Home Bronchodilator Patient pathway  Airway Clearance Plan: Incentive Spirometer     Resp Comments: resp protocol completed

## 2022-10-25 NOTE — ASSESSMENT & PLAN NOTE
Pt states she wednesday pregnant   · CT chest: Incidental finding of thyroid nodule(s)  · nonemergent thyroid ultrasound is recommended

## 2022-10-29 ENCOUNTER — HOSPITAL ENCOUNTER (INPATIENT)
Facility: HOSPITAL | Age: 67
LOS: 3 days | Discharge: HOME | End: 2022-11-02
Attending: EMERGENCY MEDICINE | Admitting: FAMILY MEDICINE

## 2022-10-29 ENCOUNTER — APPOINTMENT (EMERGENCY)
Dept: RADIOLOGY | Facility: HOSPITAL | Age: 67
End: 2022-10-29

## 2022-10-29 DIAGNOSIS — J44.1 COPD WITH ACUTE EXACERBATION (HCC): Primary | ICD-10-CM

## 2022-10-29 DIAGNOSIS — I5A MYOCARDIAL INJURY: ICD-10-CM

## 2022-10-29 DIAGNOSIS — I10 PRIMARY HYPERTENSION: ICD-10-CM

## 2022-10-29 DIAGNOSIS — Z72.0 TOBACCO ABUSE DISORDER: ICD-10-CM

## 2022-10-29 DIAGNOSIS — J96.01 ACUTE RESPIRATORY FAILURE WITH HYPOXIA (HCC): ICD-10-CM

## 2022-10-29 DIAGNOSIS — R06.09 DYSPNEA ON EXERTION: ICD-10-CM

## 2022-10-29 DIAGNOSIS — R05.1 ACUTE COUGH: ICD-10-CM

## 2022-10-29 DIAGNOSIS — R06.02 SHORTNESS OF BREATH: ICD-10-CM

## 2022-10-29 DIAGNOSIS — J44.1 COPD EXACERBATION (HCC): ICD-10-CM

## 2022-10-29 DIAGNOSIS — E78.49 OTHER HYPERLIPIDEMIA: ICD-10-CM

## 2022-10-29 DIAGNOSIS — R09.02 HYPOXIA: ICD-10-CM

## 2022-10-29 DIAGNOSIS — E04.1 THYROID NODULE: ICD-10-CM

## 2022-10-29 DIAGNOSIS — R06.03 ACUTE RESPIRATORY DISTRESS: ICD-10-CM

## 2022-10-29 LAB
ALBUMIN SERPL BCP-MCNC: 3.5 G/DL (ref 3.5–5)
ALP SERPL-CCNC: 93 U/L (ref 46–116)
ALT SERPL W P-5'-P-CCNC: 101 U/L (ref 12–78)
ANION GAP SERPL CALCULATED.3IONS-SCNC: 7 MMOL/L (ref 4–13)
AST SERPL W P-5'-P-CCNC: 64 U/L (ref 5–45)
ATRIAL RATE: 74 BPM
BASOPHILS # BLD AUTO: 0.05 THOUSANDS/ÂΜL (ref 0–0.1)
BASOPHILS NFR BLD AUTO: 0 % (ref 0–1)
BILIRUB SERPL-MCNC: 1.03 MG/DL (ref 0.2–1)
BUN SERPL-MCNC: 14 MG/DL (ref 5–25)
CALCIUM SERPL-MCNC: 8.7 MG/DL (ref 8.3–10.1)
CARDIAC TROPONIN I PNL SERPL HS: 72 NG/L
CHLORIDE SERPL-SCNC: 99 MMOL/L (ref 96–108)
CO2 SERPL-SCNC: 32 MMOL/L (ref 21–32)
CREAT SERPL-MCNC: 0.78 MG/DL (ref 0.6–1.3)
EOSINOPHIL # BLD AUTO: 0.39 THOUSAND/ÂΜL (ref 0–0.61)
EOSINOPHIL NFR BLD AUTO: 3 % (ref 0–6)
ERYTHROCYTE [DISTWIDTH] IN BLOOD BY AUTOMATED COUNT: 14.9 % (ref 11.6–15.1)
GFR SERPL CREATININE-BSD FRML MDRD: 78 ML/MIN/1.73SQ M
GLUCOSE SERPL-MCNC: 134 MG/DL (ref 65–140)
HCT VFR BLD AUTO: 50.1 % (ref 34.8–46.1)
HGB BLD-MCNC: 16.3 G/DL (ref 11.5–15.4)
IMM GRANULOCYTES # BLD AUTO: 0.05 THOUSAND/UL (ref 0–0.2)
IMM GRANULOCYTES NFR BLD AUTO: 0 % (ref 0–2)
LYMPHOCYTES # BLD AUTO: 1.68 THOUSANDS/ÂΜL (ref 0.6–4.47)
LYMPHOCYTES NFR BLD AUTO: 14 % (ref 14–44)
MCH RBC QN AUTO: 35.4 PG (ref 26.8–34.3)
MCHC RBC AUTO-ENTMCNC: 32.5 G/DL (ref 31.4–37.4)
MCV RBC AUTO: 109 FL (ref 82–98)
MONOCYTES # BLD AUTO: 1 THOUSAND/ÂΜL (ref 0.17–1.22)
MONOCYTES NFR BLD AUTO: 8 % (ref 4–12)
NEUTROPHILS # BLD AUTO: 9.04 THOUSANDS/ÂΜL (ref 1.85–7.62)
NEUTS SEG NFR BLD AUTO: 75 % (ref 43–75)
NRBC BLD AUTO-RTO: 0 /100 WBCS
P AXIS: 77 DEGREES
PLATELET # BLD AUTO: 153 THOUSANDS/UL (ref 149–390)
PMV BLD AUTO: 11.8 FL (ref 8.9–12.7)
POTASSIUM SERPL-SCNC: 3.3 MMOL/L (ref 3.5–5.3)
PR INTERVAL: 130 MS
PROT SERPL-MCNC: 7.8 G/DL (ref 6.4–8.4)
QRS AXIS: 73 DEGREES
QRSD INTERVAL: 100 MS
QT INTERVAL: 398 MS
QTC INTERVAL: 441 MS
RBC # BLD AUTO: 4.61 MILLION/UL (ref 3.81–5.12)
SODIUM SERPL-SCNC: 138 MMOL/L (ref 135–147)
T WAVE AXIS: 50 DEGREES
VENTRICULAR RATE: 74 BPM
WBC # BLD AUTO: 12.21 THOUSAND/UL (ref 4.31–10.16)

## 2022-10-29 RX ORDER — ALPRAZOLAM 0.25 MG/1
0.25 TABLET ORAL 2 TIMES DAILY PRN
Status: DISCONTINUED | OUTPATIENT
Start: 2022-10-29 | End: 2022-11-02 | Stop reason: HOSPADM

## 2022-10-29 RX ORDER — GUAIFENESIN 600 MG/1
600 TABLET, EXTENDED RELEASE ORAL EVERY 12 HOURS SCHEDULED
Status: DISCONTINUED | OUTPATIENT
Start: 2022-10-29 | End: 2022-11-02 | Stop reason: HOSPADM

## 2022-10-29 RX ORDER — POTASSIUM CHLORIDE 20 MEQ/1
40 TABLET, EXTENDED RELEASE ORAL ONCE
Status: COMPLETED | OUTPATIENT
Start: 2022-10-29 | End: 2022-10-30

## 2022-10-29 RX ORDER — LISINOPRIL 20 MG/1
20 TABLET ORAL DAILY
Status: DISCONTINUED | OUTPATIENT
Start: 2022-10-30 | End: 2022-11-02 | Stop reason: HOSPADM

## 2022-10-29 RX ORDER — IPRATROPIUM BROMIDE AND ALBUTEROL SULFATE .5; 3 MG/3ML; MG/3ML
2 SOLUTION RESPIRATORY (INHALATION) ONCE
Status: COMPLETED | OUTPATIENT
Start: 2022-10-29 | End: 2022-10-29

## 2022-10-29 RX ORDER — SODIUM CHLORIDE FOR INHALATION 0.9 %
3 VIAL, NEBULIZER (ML) INHALATION ONCE
Status: COMPLETED | OUTPATIENT
Start: 2022-10-29 | End: 2022-10-29

## 2022-10-29 RX ORDER — NICOTINE 21 MG/24HR
1 PATCH, TRANSDERMAL 24 HOURS TRANSDERMAL DAILY
Status: DISCONTINUED | OUTPATIENT
Start: 2022-10-30 | End: 2022-11-02 | Stop reason: HOSPADM

## 2022-10-29 RX ORDER — LEVALBUTEROL 1.25 MG/.5ML
1.25 SOLUTION, CONCENTRATE RESPIRATORY (INHALATION)
Status: DISCONTINUED | OUTPATIENT
Start: 2022-10-30 | End: 2022-10-30

## 2022-10-29 RX ORDER — MAGNESIUM SULFATE HEPTAHYDRATE 40 MG/ML
2 INJECTION, SOLUTION INTRAVENOUS ONCE
Status: COMPLETED | OUTPATIENT
Start: 2022-10-29 | End: 2022-10-30

## 2022-10-29 RX ORDER — ENOXAPARIN SODIUM 100 MG/ML
40 INJECTION SUBCUTANEOUS DAILY
Status: DISCONTINUED | OUTPATIENT
Start: 2022-10-30 | End: 2022-11-02 | Stop reason: HOSPADM

## 2022-10-29 RX ORDER — ACETAMINOPHEN 325 MG/1
650 TABLET ORAL EVERY 6 HOURS PRN
Status: DISCONTINUED | OUTPATIENT
Start: 2022-10-29 | End: 2022-11-02 | Stop reason: HOSPADM

## 2022-10-29 RX ORDER — METHYLPREDNISOLONE SODIUM SUCCINATE 40 MG/ML
40 INJECTION, POWDER, LYOPHILIZED, FOR SOLUTION INTRAMUSCULAR; INTRAVENOUS EVERY 8 HOURS
Status: DISCONTINUED | OUTPATIENT
Start: 2022-10-30 | End: 2022-10-31

## 2022-10-29 RX ORDER — AZITHROMYCIN 500 MG/1
500 TABLET, FILM COATED ORAL EVERY 24 HOURS
Status: DISCONTINUED | OUTPATIENT
Start: 2022-10-30 | End: 2022-11-02 | Stop reason: HOSPADM

## 2022-10-29 RX ORDER — ATORVASTATIN CALCIUM 40 MG/1
40 TABLET, FILM COATED ORAL DAILY
Status: DISCONTINUED | OUTPATIENT
Start: 2022-10-30 | End: 2022-11-02 | Stop reason: HOSPADM

## 2022-10-29 RX ORDER — METHYLPREDNISOLONE SOD SUCC 125 MG
1 VIAL (EA) INJECTION ONCE
Status: COMPLETED | OUTPATIENT
Start: 2022-10-29 | End: 2022-10-29

## 2022-10-29 RX ORDER — ASPIRIN 81 MG/1
81 TABLET ORAL DAILY
Status: DISCONTINUED | OUTPATIENT
Start: 2022-10-30 | End: 2022-11-02 | Stop reason: HOSPADM

## 2022-10-29 RX ADMIN — ALBUTEROL SULFATE 10 MG: 2.5 SOLUTION RESPIRATORY (INHALATION) at 21:17

## 2022-10-29 RX ADMIN — IPRATROPIUM BROMIDE 1 MG: 0.5 SOLUTION RESPIRATORY (INHALATION) at 21:17

## 2022-10-29 RX ADMIN — ISODIUM CHLORIDE 3 ML: 0.03 SOLUTION RESPIRATORY (INHALATION) at 21:17

## 2022-10-29 RX ADMIN — AZITHROMYCIN 500 MG: 500 INJECTION, POWDER, LYOPHILIZED, FOR SOLUTION INTRAVENOUS at 21:55

## 2022-10-30 LAB
2HR DELTA HS TROPONIN: -38 NG/L
2HR DELTA HS TROPONIN: 242 NG/L
4HR DELTA HS TROPONIN: -66 NG/L
4HR DELTA HS TROPONIN: 388 NG/L
ANION GAP SERPL CALCULATED.3IONS-SCNC: 10 MMOL/L (ref 4–13)
ATRIAL RATE: 63 BPM
BASOPHILS # BLD MANUAL: 0 THOUSAND/UL (ref 0–0.1)
BASOPHILS NFR MAR MANUAL: 0 % (ref 0–1)
BUN SERPL-MCNC: 12 MG/DL (ref 5–25)
CALCIUM SERPL-MCNC: 8.9 MG/DL (ref 8.3–10.1)
CARDIAC TROPONIN I PNL SERPL HS: 314 NG/L
CARDIAC TROPONIN I PNL SERPL HS: 460 NG/L
CARDIAC TROPONIN I PNL SERPL HS: 489 NG/L
CARDIAC TROPONIN I PNL SERPL HS: 517 NG/L
CARDIAC TROPONIN I PNL SERPL HS: 555 NG/L
CHLORIDE SERPL-SCNC: 102 MMOL/L (ref 96–108)
CO2 SERPL-SCNC: 29 MMOL/L (ref 21–32)
CREAT SERPL-MCNC: 0.62 MG/DL (ref 0.6–1.3)
EOSINOPHIL # BLD MANUAL: 0 THOUSAND/UL (ref 0–0.4)
EOSINOPHIL NFR BLD MANUAL: 0 % (ref 0–6)
ERYTHROCYTE [DISTWIDTH] IN BLOOD BY AUTOMATED COUNT: 14.7 % (ref 11.6–15.1)
GFR SERPL CREATININE-BSD FRML MDRD: 93 ML/MIN/1.73SQ M
GLUCOSE SERPL-MCNC: 195 MG/DL (ref 65–140)
HCT VFR BLD AUTO: 47.5 % (ref 34.8–46.1)
HGB BLD-MCNC: 15.2 G/DL (ref 11.5–15.4)
LYMPHOCYTES # BLD AUTO: 0.25 THOUSAND/UL (ref 0.6–4.47)
LYMPHOCYTES # BLD AUTO: 3 % (ref 14–44)
MCH RBC QN AUTO: 34.5 PG (ref 26.8–34.3)
MCHC RBC AUTO-ENTMCNC: 32 G/DL (ref 31.4–37.4)
MCV RBC AUTO: 108 FL (ref 82–98)
MONOCYTES # BLD AUTO: 0.08 THOUSAND/UL (ref 0–1.22)
MONOCYTES NFR BLD: 1 % (ref 4–12)
NEUTROPHILS # BLD MANUAL: 8.11 THOUSAND/UL (ref 1.85–7.62)
NEUTS SEG NFR BLD AUTO: 96 % (ref 43–75)
P AXIS: 78 DEGREES
PLATELET # BLD AUTO: 129 THOUSANDS/UL (ref 149–390)
PLATELET BLD QL SMEAR: ABNORMAL
PMV BLD AUTO: 12.1 FL (ref 8.9–12.7)
POTASSIUM SERPL-SCNC: 4.9 MMOL/L (ref 3.5–5.3)
PR INTERVAL: 134 MS
PROCALCITONIN SERPL-MCNC: 0.05 NG/ML
QRS AXIS: 69 DEGREES
QRSD INTERVAL: 96 MS
QT INTERVAL: 450 MS
QTC INTERVAL: 460 MS
RBC # BLD AUTO: 4.4 MILLION/UL (ref 3.81–5.12)
SODIUM SERPL-SCNC: 141 MMOL/L (ref 135–147)
T WAVE AXIS: 78 DEGREES
VENTRICULAR RATE: 63 BPM
WBC # BLD AUTO: 8.45 THOUSAND/UL (ref 4.31–10.16)

## 2022-10-30 RX ORDER — LEVALBUTEROL 1.25 MG/.5ML
1.25 SOLUTION, CONCENTRATE RESPIRATORY (INHALATION)
Status: DISCONTINUED | OUTPATIENT
Start: 2022-10-30 | End: 2022-11-02 | Stop reason: HOSPADM

## 2022-10-30 RX ORDER — ECHINACEA PURPUREA EXTRACT 125 MG
1 TABLET ORAL
Status: DISCONTINUED | OUTPATIENT
Start: 2022-10-30 | End: 2022-11-02 | Stop reason: HOSPADM

## 2022-10-30 RX ORDER — FUROSEMIDE 10 MG/ML
20 INJECTION INTRAMUSCULAR; INTRAVENOUS ONCE
Status: COMPLETED | OUTPATIENT
Start: 2022-10-30 | End: 2022-10-30

## 2022-10-30 RX ADMIN — IPRATROPIUM BROMIDE 0.5 MG: 0.5 SOLUTION RESPIRATORY (INHALATION) at 13:42

## 2022-10-30 RX ADMIN — GUAIFENESIN 600 MG: 600 TABLET, EXTENDED RELEASE ORAL at 22:52

## 2022-10-30 RX ADMIN — FUROSEMIDE 20 MG: 10 INJECTION, SOLUTION INTRAMUSCULAR; INTRAVENOUS at 13:23

## 2022-10-30 RX ADMIN — POTASSIUM CHLORIDE 40 MEQ: 1500 TABLET, EXTENDED RELEASE ORAL at 00:03

## 2022-10-30 RX ADMIN — UMECLIDINIUM BROMIDE AND VILANTEROL TRIFENATATE 1 PUFF: 62.5; 25 POWDER RESPIRATORY (INHALATION) at 09:35

## 2022-10-30 RX ADMIN — ASPIRIN 81 MG: 81 TABLET, COATED ORAL at 09:36

## 2022-10-30 RX ADMIN — METHYLPREDNISOLONE SODIUM SUCCINATE 40 MG: 40 INJECTION, POWDER, FOR SOLUTION INTRAMUSCULAR; INTRAVENOUS at 22:52

## 2022-10-30 RX ADMIN — ENOXAPARIN SODIUM 40 MG: 40 INJECTION SUBCUTANEOUS at 09:36

## 2022-10-30 RX ADMIN — METHYLPREDNISOLONE SODIUM SUCCINATE 40 MG: 40 INJECTION, POWDER, FOR SOLUTION INTRAMUSCULAR; INTRAVENOUS at 15:32

## 2022-10-30 RX ADMIN — GUAIFENESIN 600 MG: 600 TABLET, EXTENDED RELEASE ORAL at 09:36

## 2022-10-30 RX ADMIN — MAGNESIUM SULFATE HEPTAHYDRATE 2 G: 40 INJECTION, SOLUTION INTRAVENOUS at 00:29

## 2022-10-30 RX ADMIN — GUAIFENESIN 600 MG: 600 TABLET, EXTENDED RELEASE ORAL at 00:03

## 2022-10-30 RX ADMIN — IPRATROPIUM BROMIDE 0.5 MG: 0.5 SOLUTION RESPIRATORY (INHALATION) at 17:44

## 2022-10-30 RX ADMIN — LEVALBUTEROL HYDROCHLORIDE 1.25 MG: 1.25 SOLUTION, CONCENTRATE RESPIRATORY (INHALATION) at 13:42

## 2022-10-30 RX ADMIN — AZITHROMYCIN 500 MG: 500 TABLET, FILM COATED ORAL at 22:52

## 2022-10-30 RX ADMIN — LEVALBUTEROL HYDROCHLORIDE 1.25 MG: 1.25 SOLUTION, CONCENTRATE RESPIRATORY (INHALATION) at 07:40

## 2022-10-30 RX ADMIN — ATORVASTATIN CALCIUM 40 MG: 40 TABLET, FILM COATED ORAL at 09:36

## 2022-10-30 RX ADMIN — LEVALBUTEROL HYDROCHLORIDE 1.25 MG: 1.25 SOLUTION, CONCENTRATE RESPIRATORY (INHALATION) at 17:44

## 2022-10-30 RX ADMIN — METHYLPREDNISOLONE SODIUM SUCCINATE 40 MG: 40 INJECTION, POWDER, FOR SOLUTION INTRAMUSCULAR; INTRAVENOUS at 05:16

## 2022-10-30 RX ADMIN — IPRATROPIUM BROMIDE 0.5 MG: 0.5 SOLUTION RESPIRATORY (INHALATION) at 07:40

## 2022-10-30 RX ADMIN — LISINOPRIL 20 MG: 20 TABLET ORAL at 09:36

## 2022-10-30 NOTE — PLAN OF CARE
Problem: Potential for Falls  Goal: Patient will remain free of falls  Description: INTERVENTIONS:  - Educate patient/family on patient safety including physical limitations  - Instruct patient to call for assistance with activity   - Consult OT/PT to assist with strengthening/mobility   - Keep Call bell within reach  - Keep bed low and locked with side rails adjusted as appropriate  - Keep care items and personal belongings within reach  - Initiate and maintain comfort rounds  - Make Fall Risk Sign visible to staff  - Offer Toileting every 2 Hours, in advance of need  - Initiate/Maintain 2alarm  - Obtain necessary fall risk management equipment: 2  - Apply yellow socks and bracelet for high fall risk patients  - Consider moving patient to room near nurses station  Outcome: Progressing     Problem: Nutrition/Hydration-ADULT  Goal: Nutrient/Hydration intake appropriate for improving, restoring or maintaining nutritional needs  Description: Monitor and assess patient's nutrition/hydration status for malnutrition  Collaborate with interdisciplinary team and initiate plan and interventions as ordered  Monitor patient's weight and dietary intake as ordered or per policy  Utilize nutrition screening tool and intervene as necessary  Determine patient's food preferences and provide high-protein, high-caloric foods as appropriate       INTERVENTIONS:  - Monitor oral intake, urinary output, labs, and treatment plans  - Assess nutrition and hydration status and recommend course of action  - Evaluate amount of meals eaten  - Assist patient with eating if necessary   - Allow adequate time for meals  - Recommend/ encourage appropriate diets, oral nutritional supplements, and vitamin/mineral supplements  - Order, calculate, and assess calorie counts as needed  - Recommend, monitor, and adjust tube feedings and TPN/PPN based on assessed needs  - Assess need for intravenous fluids  - Provide specific nutrition/hydration education as appropriate  - Include patient/family/caregiver in decisions related to nutrition  Outcome: Progressing     Problem: RESPIRATORY - ADULT  Goal: Achieves optimal ventilation and oxygenation  Description: INTERVENTIONS:  - Assess for changes in respiratory status  - Assess for changes in mentation and behavior  - Position to facilitate oxygenation and minimize respiratory effort  - Oxygen administered by appropriate delivery if ordered  - Initiate smoking cessation education as indicated  - Encourage broncho-pulmonary hygiene including cough, deep breathe, Incentive Spirometry  - Assess the need for suctioning and aspirate as needed  - Assess and instruct to report SOB or any respiratory difficulty  - Respiratory Therapy support as indicated  Outcome: Progressing     Problem: METABOLIC, FLUID AND ELECTROLYTES - ADULT  Goal: Electrolytes maintained within normal limits  Description: INTERVENTIONS:  - Monitor labs and assess patient for signs and symptoms of electrolyte imbalances  - Administer electrolyte replacement as ordered  - Monitor response to electrolyte replacements, including repeat lab results as appropriate  - Instruct patient on fluid and nutrition as appropriate  Outcome: Progressing  Goal: Fluid balance maintained  Description: INTERVENTIONS:  - Monitor labs   - Monitor I/O and WT  - Instruct patient on fluid and nutrition as appropriate  - Assess for signs & symptoms of volume excess or deficit  Outcome: Progressing

## 2022-10-30 NOTE — H&P
[address] [facility]  H&P- Teena Adams Colon 1955, 78 y o  female MRN: [id number]  Unit/Bed#: ED 29 Encounter: [id number]  Primary Care Provider: Ayaka Cary MD   Date and time admitted to hospital: 10/29/2022  8:46 PM    * Acute respiratory failure with hypoxia [facility])  Assessment & Plan  Patient with recent discharge on 10/25 following stay for COPD exacerbation and acute respiratory failure  Presenting today with increasing SOB for the past day  Reports some improvement in symptoms after ED interventions  Denies other symptom/complaint at this time  BP (!) 201/91   Pulse 71   Temp 97 7 °F (36 5 °C) (Oral)   Resp (!) 24   SpO2 96%     · Was 80%O2 RA on ED arrival  · Currently 95%O2 2L NC  · Experiencing COPD exacerbation as below; lower suspicion of infectious etiology at this time  · Continue supplemental oxygen; wean as appropriate  · Incentive spirometry and tx of COPD exacerbation as below     COPD with acute exacerbation (HCC)  Assessment & Plan  · Recent discharge on 10/25 following a stay for the same  · Experiencing increasing SOB for the past day  · Was 80% O2 RA on ED presentation (see above)  · Patient reports she has not been able to take her newly prescribed Anoro at home due to cost  · Reports some improvement in symptoms after neb treatments in ED  · Continue home Anoro equivalent overnight  · TID Xopenex and atrovent ordered   · Currently on prednisone taper; will transition back to solumedrol 40mg q8H  · Continue Azithromycin   · Incentive spirometry and pulmonary toileting     Myocardial injury  Assessment & Plan  · Troponin 72 on admission; was 312 on recent discharge so appears to be improving   · EKG showing some ST depressions possibly new from prior?   · Denies chest pain  · Evaluated by cardiology during prior admission w/ injury thought to be due to hypoxia/demand ischemia  · Tele monitoring   · Trend remainder of troponin/EKG     Primary hypertension  Assessment & Plan  · /91 on ED presentation  · Currently 151/90  · Respiratory distress thought to be playing role  · Continue home lisinopril  · PRN IV antihypertensives as appropriate   · Monitor BP per unit protocol     Tobacco abuse disorder  Assessment & Plan  · Contributing to patient's poor respiratory status as above  · Recommend complete cessation  · Nicotine patch ordered     Hyperlipemia  Assessment & Plan  · Continue home statin    VTE Pharmacologic Prophylaxis: VTE Score: 6 High Risk (Score >/= 5) - Pharmacological DVT Prophylaxis Ordered: enoxaparin (Lovenox)  Sequential Compression Devices Ordered  Code Status: Level 1 - Full Code   Discussion with family: update in AM      Anticipated Length of Stay: Patient will be admitted on an observation basis with an anticipated length of stay of less than 2 midnights secondary to COPD exacerbation   Total Time for Visit, including Counseling / Coordination of Care: 45 minutes Greater than 50% of this total time spent on direct patient counseling and coordination of care  Chief Complaint: SOB    History of Present Illness:  Lou Bland is a 78 y o  female with a PMH of COPD, tobacco abuse, HTN, and HLD who presents with SOB  Patient with recent discharge on 10/25 following stay for COPD exacerbation and acute respiratory failure  Presenting today with increasing SOB for the past day  Reports some improvement in symptoms after ED interventions  Denies other symptom/complaint at this time  All questions answered at the bedside to the best of my ability  Review of Systems:  Review of Systems   Constitutional: Negative for activity change, appetite change, chills, diaphoresis, fatigue and fever  HENT: Negative for congestion, ear pain, nosebleeds and trouble swallowing  Eyes: Negative for pain and visual disturbance  Respiratory: Positive for shortness of breath and wheezing   Negative for apnea, cough and chest tightness  Cardiovascular: Negative for chest pain, palpitations and leg swelling  Gastrointestinal: Negative for abdominal distention, abdominal pain, blood in stool, constipation, diarrhea, nausea and vomiting  Endocrine: Negative for cold intolerance, heat intolerance and polyuria  Genitourinary: Negative for difficulty urinating, dysuria, flank pain and hematuria  Musculoskeletal: Negative for arthralgias, neck pain and neck stiffness  Skin: Negative for color change, rash and wound  Neurological: Negative for dizziness, tremors, syncope, weakness, light-headedness, numbness and headaches  Hematological: Negative for adenopathy  All other systems reviewed and are negative  Past Medical and Surgical History:   Past Medical History:   Diagnosis Date   • COPD (chronic obstructive pulmonary disease) ([facility] [address] )    • Hyperlipemia    • Hypertension        No past surgical history on file  Meds/Allergies:  Prior to Admission medications    Medication Sig Start Date End Date Taking?  Authorizing Provider   albuterol (PROVENTIL HFA,VENTOLIN HFA) 90 mcg/act inhaler Inhale 2 puffs every 6 (six) hours as needed for wheezing    Historical Provider, MD   ALPRAZolam Azalicia Gomez) 0 25 mg tablet Take 0 25 mg by mouth 2 (two) times a day as needed 9/9/22   Historical Provider, MD   aspirin (ECOTRIN LOW STRENGTH) 81 mg EC tablet Take 81 mg by mouth daily    Historical Provider, MD   atorvastatin (LIPITOR) 40 mg tablet Take 40 mg by mouth daily 9/19/22   Historical Provider, MD   azithromycin (ZITHROMAX) 500 MG tablet Take 1 tablet (500 mg total) by mouth every 24 hours for 4 doses 10/25/22 10/29/22  Boby Santiago DO   guaiFENesin (MUCINEX) 600 mg 12 hr tablet Take 1 tablet (600 mg total) by mouth every 12 (twelve) hours 10/25/22   Boby Santiago DO   ipratropium (ATROVENT) 0 02 % nebulizer solution Take 2 5 mL (0 5 mg total) by nebulization 3 (three) times a day 10/25/22   Dee Hoskins DO levalbuterol (XOPENEX) 1 25 mg/0 5 mL nebulizer solution Take 0 5 mL (1 25 mg total) by nebulization 3 (three) times a day 10/25/22   Boby Santiago DO   lisinopril (ZESTRIL) 20 mg tablet Take 1 tablet (20 mg total) by mouth daily Do not start before October 26, 2022  10/26/22   Boby Santiago DO   metoprolol succinate (TOPROL-XL) 50 mg 24 hr tablet Take 50 mg by mouth daily  Patient not taking: Reported on 10/24/2022    Historical Provider, MD   predniSONE 10 mg tablet Take 6 tablets (60 mg total) by mouth daily for 3 days, THEN 5 tablets (50 mg total) daily for 3 days, THEN 4 tablets (40 mg total) daily for 3 days, THEN 3 tablets (30 mg total) daily for 3 days, THEN 2 tablets (20 mg total) daily for 3 days, THEN 1 tablet (10 mg total) daily for 3 days  10/25/22 11/12/22  Boby Santiago DO   umeclidinium-vilanterol (ANORO ELLIPTA) 62 5-25 MCG/INH inhaler Inhale 1 puff daily Do not start before October 26, 2022  10/26/22   Casey Obando DO     I have reviewed home medications with patient personally  Allergies: No Known Allergies    Social History:  Marital Status:    Occupation: N/A  Patient Pre-hospital Living Situation: Home  Patient Pre-hospital Level of Mobility: walks  Patient Pre-hospital Diet Restrictions: None  Substance Use History:   Social History     Substance and Sexual Activity   Alcohol Use Yes     Social History     Tobacco Use   Smoking Status Current Every Day Smoker   • Packs/day: 1 00   Smokeless Tobacco Never Used     Social History     Substance and Sexual Activity   Drug Use Never       Family History:  No family history on file  Physical Exam:     Vitals:   Blood Pressure: (!) 198/91 (10/29/22 2131)  Pulse: 80 (10/29/22 2131)  Temperature: 97 7 °F (36 5 °C) (10/29/22 2049)  Temp Source: Oral (10/29/22 2049)  Respirations: (!) 24 (10/29/22 2131)  SpO2: 95 % (10/29/22 2131)    Physical Exam  Vitals and nursing note reviewed     Constitutional:       General: She is not in acute distress  Appearance: Normal appearance  HENT:      Head: Normocephalic and atraumatic  Right Ear: External ear normal       Left Ear: External ear normal       Nose: Nose normal       Mouth/Throat:      Mouth: Mucous membranes are moist    Eyes:      Pupils: Pupils are equal, round, and reactive to light  Cardiovascular:      Rate and Rhythm: Normal rate and regular rhythm  Pulses: Normal pulses  Heart sounds: Normal heart sounds  No murmur heard  Pulmonary:      Effort: Pulmonary effort is normal  No respiratory distress  Breath sounds: Decreased air movement present  Wheezing (scattered) present  No rales  Comments: Able to speak in full sentences  Chest:      Chest wall: No tenderness  Abdominal:      General: Bowel sounds are normal  There is no distension  Palpations: Abdomen is soft  There is no mass  Tenderness: There is no abdominal tenderness  There is no guarding  Musculoskeletal:         General: No swelling or tenderness  Cervical back: Normal range of motion and neck supple  No rigidity or tenderness  Right lower leg: No edema  Left lower leg: No edema  Skin:     General: Skin is warm and dry  Capillary Refill: Capillary refill takes less than 2 seconds  Findings: No lesion or rash  Neurological:      General: No focal deficit present  Mental Status: She is alert     Psychiatric:         Mood and Affect: Mood normal           Additional Data:     Lab Results:  Results from last 7 days   Lab Units 10/29/22  2106   WBC Thousand/uL 12 21*   HEMOGLOBIN g/dL 16 3*   HEMATOCRIT % 50 1*   PLATELETS Thousands/uL 153   NEUTROS PCT % 75   LYMPHS PCT % 14   MONOS PCT % 8   EOS PCT % 3     Results from last 7 days   Lab Units 10/29/22  2106   SODIUM mmol/L 138   POTASSIUM mmol/L 3 3*   CHLORIDE mmol/L 99   CO2 mmol/L 32   BUN mg/dL 14   CREATININE mg/dL 0 78   ANION GAP mmol/L 7   CALCIUM mg/dL 8 7   ALBUMIN g/dL 3  5   TOTAL BILIRUBIN mg/dL 1 03*   ALK PHOS U/L 93   ALT U/L 101*   AST U/L 64*   GLUCOSE RANDOM mg/dL 134                 Results from last 7 days   Lab Units 10/24/22  0734   PROCALCITONIN ng/ml 0 05       Imaging: Personally reviewed the following imaging: chest xray  XR chest 2 views    (Results Pending)       EKG and Other Studies Reviewed on Admission:   · EKG: EKG reviewed   ** Please Note: This note has been constructed using a voice recognition system   **

## 2022-10-30 NOTE — ASSESSMENT & PLAN NOTE
· /91 on ED presentation  · Currently 151/90  · Respiratory distress thought to be playing role  · Continue home lisinopril  · PRN IV antihypertensives as appropriate   · Monitor BP per unit protocol

## 2022-10-30 NOTE — ASSESSMENT & PLAN NOTE
** recently discharged on 10/25 following stay for COPD exacerbation/demand ischemia  Reports she was doing well initially however worsening shortness of breath which began 2 days prior to admission associated with increased productive cough      · Was 80%O2 RA on ED arrival  · Currently 95%O2 3L NC  · Experiencing COPD exacerbation as below  · Continue supplemental oxygen; wean as appropriate

## 2022-10-30 NOTE — ASSESSMENT & PLAN NOTE
Patient with recent discharge on 10/25 following stay for COPD exacerbation and acute respiratory failure  Presenting today with increasing SOB for the past day  Reports some improvement in symptoms after ED interventions  Denies other symptom/complaint at this time       BP (!) 201/91   Pulse 71   Temp 97 7 °F (36 5 °C) (Oral)   Resp (!) 24   SpO2 96%     · Was 80%O2 RA on ED arrival  · Currently 95%O2 2L NC  · Experiencing COPD exacerbation as below; lower suspicion of infectious etiology at this time  · Continue supplemental oxygen; wean as appropriate  · Incentive spirometry and tx of COPD exacerbation as below

## 2022-10-30 NOTE — PROGRESS NOTES
Facial and neck redness noted  Patient states she used no rinse soap   No distress noted Dr Darryn Quiñones aware

## 2022-10-30 NOTE — ASSESSMENT & PLAN NOTE
· Recent discharge on 10/25 following a stay for the same  · Experiencing increasing SOB for the past day  · Was 80% O2 RA on ED presentation (see above)  · Patient reports she has not been able to take her newly prescribed Anoro at home due to cost  · Reports some improvement in symptoms after neb treatments in ED  · Continue home Anoro equivalent overnight  · TID Xopenex and atrovent ordered   · Currently on prednisone taper; will transition back to solumedrol 40mg q8H  · Continue Azithromycin   · Incentive spirometry and pulmonary toileting

## 2022-10-30 NOTE — ASSESSMENT & PLAN NOTE
· Contributing to patient's poor respiratory status as above  · Recommend complete cessation  · Nicotine patch ordered

## 2022-10-30 NOTE — ED PROVIDER NOTES
History  Chief Complaint   Patient presents with   • Shortness of Breath     Sob, 2 duo nebs and solu by ems, initial sats in 80s      64-year-old female history of COPD presenting with acute exacerbation  Recently admitted for similar about 1 week ago  Patient reports compliance with home albuterol and nebulizer and steroid taper  However, patient stated that she was unable to get the daily medication Anoro because of cost   Called EMS earlier today for worsening shortness of breath and wheezing  Somewhat improved after nebulizer and refused transport  Had worsening symptoms throughout the day and was transported to the hospital at that time with EMS  Received nebulizers and steroids in route  Denies any chest pain or abdominal pain nausea/vomiting  Also denies any systemic symptoms such as fevers or chills  Reports compliance with z-joey  Denies any other complaints  Chsrt reviewed  Past Medical History:  No date: COPD (chronic obstructive pulmonary disease) (HCC)  No date: Hyperlipemia  No date: Hypertension  Family History: non-contributory  Social History            Prior to Admission Medications   Prescriptions Last Dose Informant Patient Reported? Taking?    ALPRAZolam (XANAX) 0 25 mg tablet Past Week at Unknown time  Yes Yes   Sig: Take 0 25 mg by mouth 2 (two) times a day as needed   albuterol (PROVENTIL HFA,VENTOLIN HFA) 90 mcg/act inhaler 10/29/2022 at Unknown time  Yes Yes   Sig: Inhale 2 puffs every 6 (six) hours as needed for wheezing   aspirin (ECOTRIN LOW STRENGTH) 81 mg EC tablet 10/29/2022 at Unknown time  Yes Yes   Sig: Take 81 mg by mouth daily   atorvastatin (LIPITOR) 40 mg tablet 10/29/2022 at Unknown time  Yes Yes   Sig: Take 40 mg by mouth daily   azithromycin (ZITHROMAX) 500 MG tablet 10/29/2022 at Unknown time  No Yes   Sig: Take 1 tablet (500 mg total) by mouth every 24 hours for 4 doses   guaiFENesin (MUCINEX) 600 mg 12 hr tablet 10/29/2022 at Unknown time  No Yes   Sig: Take 1 tablet (600 mg total) by mouth every 12 (twelve) hours   ipratropium (ATROVENT) 0 02 % nebulizer solution 10/29/2022 at Unknown time  No Yes   Sig: Take 2 5 mL (0 5 mg total) by nebulization 3 (three) times a day   levalbuterol (XOPENEX) 1 25 mg/0 5 mL nebulizer solution 10/29/2022 at Unknown time  No Yes   Sig: Take 0 5 mL (1 25 mg total) by nebulization 3 (three) times a day   lisinopril (ZESTRIL) 20 mg tablet 10/29/2022 at Unknown time  No Yes   Sig: Take 1 tablet (20 mg total) by mouth daily Do not start before October 26, 2022  metoprolol succinate (TOPROL-XL) 50 mg 24 hr tablet 10/29/2022 at Unknown time  Yes Yes   Sig: Take 50 mg by mouth daily   predniSONE 10 mg tablet 10/29/2022 at Unknown time  No Yes   Sig: Take 6 tablets (60 mg total) by mouth daily for 3 days, THEN 5 tablets (50 mg total) daily for 3 days, THEN 4 tablets (40 mg total) daily for 3 days, THEN 3 tablets (30 mg total) daily for 3 days, THEN 2 tablets (20 mg total) daily for 3 days, THEN 1 tablet (10 mg total) daily for 3 days  umeclidinium-vilanterol (ANORO ELLIPTA) 62 5-25 MCG/INH inhaler 10/29/2022 at Unknown time  No Yes   Sig: Inhale 1 puff daily Do not start before October 26, 2022  Facility-Administered Medications: None       Past Medical History:   Diagnosis Date   • COPD (chronic obstructive pulmonary disease) ([facility] [address] )    • Hyperlipemia    • Hypertension        History reviewed  No pertinent surgical history  History reviewed  No pertinent family history  I have reviewed and agree with the history as documented  E-Cigarette/Vaping   • E-Cigarette Use Never User      E-Cigarette/Vaping Substances     Social History     Tobacco Use   • Smoking status: Current Every Day Smoker     Packs/day: 1 00   • Smokeless tobacco: Never Used   Vaping Use   • Vaping Use: Never used   Substance Use Topics   • Alcohol use:  Yes     Alcohol/week: 15 0 standard drinks     Types: 15 Shots of liquor per week   • Drug use: Never       Review of Systems   Constitutional: Negative for appetite change, chills, diaphoresis, fever and unexpected weight change  HENT: Negative for congestion and rhinorrhea  Eyes: Negative for photophobia and visual disturbance  Respiratory: Positive for cough, chest tightness and shortness of breath  Cardiovascular: Negative for chest pain, palpitations and leg swelling  Gastrointestinal: Negative for abdominal distention, abdominal pain, blood in stool, constipation, diarrhea, nausea and vomiting  Genitourinary: Negative for dysuria and hematuria  Musculoskeletal: Negative for back pain, joint swelling, neck pain and neck stiffness  Skin: Negative for color change, pallor, rash and wound  Neurological: Negative for dizziness, syncope, weakness, light-headedness and headaches  Psychiatric/Behavioral: Negative for agitation  All other systems reviewed and are negative  Physical Exam  Physical Exam  Vitals and nursing note reviewed  Constitutional:       General: She is in acute distress  Appearance: Normal appearance  She is ill-appearing  She is not toxic-appearing or diaphoretic  HENT:      Head: Normocephalic and atraumatic  Nose: Nose normal  No congestion or rhinorrhea  Mouth/Throat:      Mouth: Mucous membranes are moist       Pharynx: Oropharynx is clear  No oropharyngeal exudate or posterior oropharyngeal erythema  Eyes:      General: No scleral icterus  Right eye: No discharge  Left eye: No discharge  Extraocular Movements: Extraocular movements intact  Conjunctiva/sclera: Conjunctivae normal       Pupils: Pupils are equal, round, and reactive to light  Neck:      Vascular: No JVD  Trachea: No tracheal deviation  Comments: Supple  Normal range of motion  Cardiovascular:      Rate and Rhythm: Normal rate and regular rhythm  Heart sounds: Normal heart sounds  No murmur heard  No friction rub  No gallop        Comments: Normal rate and regular rhythm  Pulmonary:      Effort: Tachypnea and respiratory distress present  Breath sounds: No stridor  Examination of the right-upper field reveals wheezing  Examination of the left-upper field reveals wheezing  Examination of the right-middle field reveals decreased breath sounds and wheezing  Examination of the left-middle field reveals decreased breath sounds and wheezing  Examination of the right-lower field reveals decreased breath sounds and wheezing  Examination of the left-lower field reveals decreased breath sounds and wheezing  Decreased breath sounds and wheezing present  No rales  Comments: Tachypneic high 20s with bilateral decreased lower breath sounds and wheezing throughout  Chest:      Chest wall: No tenderness  Abdominal:      General: Bowel sounds are normal  There is no distension  Palpations: Abdomen is soft  Tenderness: There is no abdominal tenderness  There is no right CVA tenderness, left CVA tenderness, guarding or rebound  Comments: Soft, nontender, nondistended  Normal bowel sounds throughout   Musculoskeletal:         General: No swelling, tenderness, deformity or signs of injury  Normal range of motion  Cervical back: Normal range of motion and neck supple  No rigidity  No muscular tenderness  Right lower leg: No edema  Left lower leg: No edema  Lymphadenopathy:      Cervical: No cervical adenopathy  Skin:     General: Skin is warm and dry  Coloration: Skin is not pale  Findings: No erythema or rash  Neurological:      General: No focal deficit present  Mental Status: She is alert  Mental status is at baseline  Sensory: No sensory deficit  Motor: No weakness or abnormal muscle tone  Coordination: Coordination normal       Gait: Gait normal       Comments: Alert  Strength and sensation grossly intact  Ambulatory without difficulty at baseline      Psychiatric:         Behavior: Behavior normal          Thought Content:  Thought content normal          Vital Signs  ED Triage Vitals   Temperature Pulse Respirations Blood Pressure SpO2   10/29/22 2049 10/29/22 2049 10/29/22 2049 10/29/22 2049 10/29/22 2049   97 7 °F (36 5 °C) 71 (!) 24 (!) 201/91 96 %      Temp Source Heart Rate Source Patient Position - Orthostatic VS BP Location FiO2 (%)   10/29/22 2049 10/29/22 2100 10/29/22 2100 10/29/22 2100 --   Oral Monitor Sitting Left arm       Pain Score       10/29/22 2049       No Pain           Vitals:    10/30/22 1300 10/30/22 1305 10/30/22 1451 10/30/22 1500   BP:  114/63 113/63 113/63   Pulse: 60 62 70 62   Patient Position - Orthostatic VS:    Lying         Visual Acuity      ED Medications  Medications   ALPRAZolam (XANAX) tablet 0 25 mg (has no administration in time range)   aspirin (ECOTRIN LOW STRENGTH) EC tablet 81 mg (81 mg Oral Given 10/30/22 0936)   atorvastatin (LIPITOR) tablet 40 mg (40 mg Oral Given 10/30/22 0936)   guaiFENesin (MUCINEX) 12 hr tablet 600 mg (600 mg Oral Given 10/30/22 0936)   lisinopril (ZESTRIL) tablet 20 mg (20 mg Oral Given 10/30/22 0936)   umeclidinium-vilanterol 62 5-25 mcg/actuation inhaler 1 puff (1 puff Inhalation Given 10/30/22 0935)   acetaminophen (TYLENOL) tablet 650 mg (has no administration in time range)   nicotine (NICODERM CQ) 21 mg/24 hr TD 24 hr patch 1 patch (1 patch Transdermal Patch Removed 10/30/22 0935)   methylPREDNISolone sodium succinate (Solu-MEDROL) injection 40 mg (40 mg Intravenous Given 10/30/22 1532)   enoxaparin (LOVENOX) subcutaneous injection 40 mg (40 mg Subcutaneous Given 10/30/22 0936)   azithromycin (ZITHROMAX) tablet 500 mg (has no administration in time range)   sodium chloride (OCEAN) 0 65 % nasal spray 1 spray (has no administration in time range)   ipratropium (ATROVENT) 0 02 % inhalation solution 0 5 mg (0 5 mg Nebulization Given 10/30/22 8203)   levalbuterol (XOPENEX) inhalation solution 1 25 mg (1 25 mg Nebulization Given 10/30/22 1744)   methylPREDNISolone sodium succinate (FOR EMS ONLY) (Solu-MEDROL) 125 MG injection 125 mg (0 mg Does not apply Given to EMS 10/29/22 2244)   ipratropium-albuterol (FOR EMS ONLY) (DUO-NEB) 0 5-2 5 mg/3 mL inhalation solution 6 mL (0 mL Does not apply Given to EMS 10/29/22 2244)   albuterol inhalation solution 10 mg (10 mg Nebulization Given 10/29/22 2117)     And   ipratropium (ATROVENT) 0 02 % inhalation solution 1 mg (1 mg Nebulization Given 10/29/22 2117)     And   sodium chloride 0 9 % inhalation solution 3 mL (3 mL Nebulization Given 10/29/22 2117)   azithromycin (ZITHROMAX) 500 mg in sodium chloride 0 9% 250mL IVPB 500 mg (0 mg Intravenous Stopped 10/30/22 0942)   potassium chloride (K-DUR,KLOR-CON) CR tablet 40 mEq (40 mEq Oral Given 10/30/22 0003)   magnesium sulfate 2 g/50 mL IVPB (premix) 2 g (0 g Intravenous Stopped 10/30/22 1303)   furosemide (LASIX) injection 20 mg (20 mg Intravenous Given 10/30/22 1323)       Diagnostic Studies  Results Reviewed     Procedure Component Value Units Date/Time    CBC and differential [[id number]]  (Abnormal) Collected: 10/30/22 0510    Lab Status: Final result Specimen: Blood from Arm, Left Updated: 10/30/22 0647     WBC 8 45 Thousand/uL      RBC 4 40 Million/uL      Hemoglobin 15 2 g/dL      Hematocrit 47 5 %       fL      MCH 34 5 pg      MCHC 32 0 g/dL      RDW 14 7 %      MPV 12 1 fL      Platelets 871 Thousands/uL     Narrative: This is an appended report  These results have been appended to a previously verified report      Procalcitonin [[id number]]  (Normal) Collected: 10/30/22 0510    Lab Status: Final result Specimen: Blood from Arm, Left Updated: 10/30/22 0617     Procalcitonin 0 05 ng/ml     HS Troponin I 4hr [[id number]]  (Abnormal) Collected: 10/30/22 0510    Lab Status: Final result Specimen: Blood from Arm, Left Updated: 10/30/22 0605     hs TnI 4hr 460 ng/L      Delta 4hr hsTnI 388 ng/L     Basic metabolic panel [[id number]] (Abnormal) Collected: 10/30/22 0510    Lab Status: Final result Specimen: Blood from Arm, Left Updated: 10/30/22 0601     Sodium 141 mmol/L      Potassium 4 9 mmol/L      Chloride 102 mmol/L      CO2 29 mmol/L      ANION GAP 10 mmol/L      BUN 12 mg/dL      Creatinine 0 62 mg/dL      Glucose 195 mg/dL      Calcium 8 9 mg/dL      eGFR 93 ml/min/1 73sq m     Narrative:      [place] guidelines for Chronic Kidney Disease (CKD):   •  Stage 1 with normal or high GFR (GFR > 90 mL/min/1 73 square meters)  •  Stage 2 Mild CKD (GFR = 60-89 mL/min/1 73 square meters)  •  Stage 3A Moderate CKD (GFR = 45-59 mL/min/1 73 square meters)  •  Stage 3B Moderate CKD (GFR = 30-44 mL/min/1 73 square meters)  •  Stage 4 Severe CKD (GFR = 15-29 mL/min/1 73 square meters)  •  Stage 5 End Stage CKD (GFR <15 mL/min/1 73 square meters)  Note: GFR calculation is accurate only with a steady state creatinine    HS Troponin I 2hr [[id number]]  (Abnormal) Collected: 10/30/22 0042    Lab Status: Final result Specimen: Blood from Arm, Left Updated: 10/30/22 0114     hs TnI 2hr 314 ng/L      Delta 2hr hsTnI 242 ng/L     HS Troponin 0hr (reflex protocol) [[id number]]  (Abnormal) Collected: 10/29/22 2106    Lab Status: Final result Specimen: Blood from Arm, Right Updated: 10/29/22 2135     hs TnI 0hr 72 ng/L     Comprehensive metabolic panel [[id number]]  (Abnormal) Collected: 10/29/22 2106    Lab Status: Final result Specimen: Blood from Arm, Right Updated: 10/29/22 2128     Sodium 138 mmol/L      Potassium 3 3 mmol/L      Chloride 99 mmol/L      CO2 32 mmol/L      ANION GAP 7 mmol/L      BUN 14 mg/dL      Creatinine 0 78 mg/dL      Glucose 134 mg/dL      Calcium 8 7 mg/dL      AST 64 U/L       U/L      Alkaline Phosphatase 93 U/L      Total Protein 7 8 g/dL      Albumin 3 5 g/dL      Total Bilirubin 1 03 mg/dL      eGFR 78 ml/min/1 73sq m     Narrative:      [place] guidelines for Chronic Kidney Disease (CKD):   •  Stage 1 with normal or high GFR (GFR > 90 mL/min/1 73 square meters)  •  Stage 2 Mild CKD (GFR = 60-89 mL/min/1 73 square meters)  •  Stage 3A Moderate CKD (GFR = 45-59 mL/min/1 73 square meters)  •  Stage 3B Moderate CKD (GFR = 30-44 mL/min/1 73 square meters)  •  Stage 4 Severe CKD (GFR = 15-29 mL/min/1 73 square meters)  •  Stage 5 End Stage CKD (GFR <15 mL/min/1 73 square meters)  Note: GFR calculation is accurate only with a steady state creatinine    CBC and differential [[id number]]  (Abnormal) Collected: 10/29/22 2106    Lab Status: Final result Specimen: Blood from Arm, Right Updated: 10/29/22 2120     WBC 12 21 Thousand/uL      RBC 4 61 Million/uL      Hemoglobin 16 3 g/dL      Hematocrit 50 1 %       fL      MCH 35 4 pg      MCHC 32 5 g/dL      RDW 14 9 %      MPV 11 8 fL      Platelets 869 Thousands/uL      nRBC 0 /100 WBCs      Neutrophils Relative 75 %      Immat GRANS % 0 %      Lymphocytes Relative 14 %      Monocytes Relative 8 %      Eosinophils Relative 3 %      Basophils Relative 0 %      Neutrophils Absolute 9 04 Thousands/µL      Immature Grans Absolute 0 05 Thousand/uL      Lymphocytes Absolute 1 68 Thousands/µL      Monocytes Absolute 1 00 Thousand/µL      Eosinophils Absolute 0 39 Thousand/µL      Basophils Absolute 0 05 Thousands/µL                  XR chest 2 views   Final Result by Cristian Larson MD (10/30 4805)      Mild pulmonary venous congestion                    Workstation performed: [id number]                    Procedures  Procedures         ED Course             HEART Risk Score    Flowsheet Row Most Recent Value   Heart Score Risk Calculator    History 0 Filed at: 10/29/2022 2200   ECG 1 Filed at: 10/29/2022 2200   Age 2 Filed at: 10/29/2022 2200   Risk Factors 2 Filed at: 10/29/2022 2200   Troponin 2 Filed at: 10/29/2022 2200   HEART Score 7 Filed at: 10/29/2022 2200                     Initial Sepsis Screening     Row Name 10/29/22 2216 Is the patient's history suggestive of a new or worsening infection? No  -CE        Suspected source of infection --        Are two or more of the following signs & symptoms of infection both present and new to the patient? --        Indicate SIRS criteria --        If the answer is yes to both questions, suspicion of sepsis is present --        If severe sepsis is present AND tissue hypoperfusion perists in the hour after fluid resuscitation or lactate > 4, the patient meets criteria for SEPTIC SHOCK --        Are any of the following organ dysfunction criteria present within 6 hours of suspected infection and SIRS criteria that are NOT considered to be chronic conditions? --        Organ dysfunction --        Date of presentation of severe sepsis --        Time of presentation of severe sepsis --        Tissue hypoperfusion persists in the hour after crystalloid fluid administration, evidenced, by either: --        Was hypotension present within one hour of the conclusion of crystalloid fluid administration? --        Date of presentation of septic shock --        Time of presentation of septic shock --              User Key  (r) = Recorded By, (t) = Taken By, (c) = Cosigned By    [facility] [address] Name Provider Elvie Steward MD Physician                              MDM  Number of Diagnoses or Management Options  Acute cough  Acute respiratory distress  COPD with acute exacerbation (HCC)  Dyspnea on exertion  Hypoxia  Shortness of breath  Diagnosis management comments: 23-year-old female history of COPD presenting with acute exacerbation  Plan for cardiac evaluaiton including EKG and trop and CXR  [facility]  Already received IV steroids with EMS  Reassess  EKG NSR with nonspecific ST abnormalities  CXR no acute process  Labs notable for K 3 3 repleted, mild LFT elevations, trop 72  Symptoms beginning to improve with BURCH neb  Added IV mag  Sats mid 90s during neb   Discussed options with patient  Likely needs daily controller medication but patient unable to afford her prescription  Already taking steroids and has albuterol at home  Admitted  Amount and/or Complexity of Data Reviewed  Clinical lab tests: reviewed and ordered  Tests in the radiology section of CPT®: ordered and reviewed  Tests in the medicine section of CPT®: ordered and reviewed  Review and summarize past medical records: yes  Discuss the patient with other providers: yes  Independent visualization of images, tracings, or specimens: yes    Risk of Complications, Morbidity, and/or Mortality  Presenting problems: high  Diagnostic procedures: high  Management options: high    Patient Progress  Patient progress: improved      Disposition  Final diagnoses:   COPD with acute exacerbation ([facility] )   Hypoxia   Acute respiratory distress   Dyspnea on exertion   Shortness of breath   Acute cough     Time reflects when diagnosis was documented in both MDM as applicable and the Disposition within this note     Time User Action Codes Description Comment    10/29/2022 10:16 PM Kiley Amabile Add [J44 1] COPD with acute exacerbation ([facility] )     10/29/2022 10:16 PM Kiley Amabile Add [R09 02] Hypoxia     10/29/2022 10:16 PM Kiley Amabile Add [R06 03] Acute respiratory distress     10/29/2022 10:16 PM Kiley Amabile Add [R06 09] Dyspnea on exertion     10/29/2022 10:16 PM Kiley Amabile Add [R06 02] Shortness of breath     10/29/2022 10:16 PM Kiley Amabile Add [R05 1] Acute cough       ED Disposition     ED Disposition   Admit    Condition   Stable    Date/Time   Sat Oct 29, 2022 10:16 PM    Comment   Case was discussed with MAN and the patient's admission status was agreed to be Admission Status: observation status to the service of Dr José Miguel Shirley              Follow-up Information    None         Current Discharge Medication List      CONTINUE these medications which have NOT CHANGED    Details   albuterol (PROVENTIL HFA,VENTOLIN HFA) 90 mcg/act inhaler Inhale 2 puffs every 6 (six) hours as needed for wheezing      ALPRAZolam (XANAX) 0 25 mg tablet Take 0 25 mg by mouth 2 (two) times a day as needed      aspirin (ECOTRIN LOW STRENGTH) 81 mg EC tablet Take 81 mg by mouth daily      atorvastatin (LIPITOR) 40 mg tablet Take 40 mg by mouth daily      guaiFENesin (MUCINEX) 600 mg 12 hr tablet Take 1 tablet (600 mg total) by mouth every 12 (twelve) hours  Qty: 14 tablet, Refills: 0    Associated Diagnoses: COPD exacerbation (HCC)      ipratropium (ATROVENT) 0 02 % nebulizer solution Take 2 5 mL (0 5 mg total) by nebulization 3 (three) times a day  Qty: 225 mL, Refills: 0    Associated Diagnoses: COPD exacerbation (HCC)      levalbuterol (XOPENEX) 1 25 mg/0 5 mL nebulizer solution Take 0 5 mL (1 25 mg total) by nebulization 3 (three) times a day  Qty: 90 each, Refills: 0    Associated Diagnoses: COPD exacerbation (HCC)      lisinopril (ZESTRIL) 20 mg tablet Take 1 tablet (20 mg total) by mouth daily Do not start before October 26, 2022  Qty: 30 tablet, Refills: 0    Associated Diagnoses: Primary hypertension      metoprolol succinate (TOPROL-XL) 50 mg 24 hr tablet Take 50 mg by mouth daily      predniSONE 10 mg tablet Take 6 tablets (60 mg total) by mouth daily for 3 days, THEN 5 tablets (50 mg total) daily for 3 days, THEN 4 tablets (40 mg total) daily for 3 days, THEN 3 tablets (30 mg total) daily for 3 days, THEN 2 tablets (20 mg total) daily for 3 days, THEN 1 tablet (10 mg total) daily for 3 days  Qty: 63 tablet, Refills: 0    Associated Diagnoses: COPD exacerbation ([address] )      umeclidinium-vilanterol (ANORO ELLIPTA) 62 5-25 MCG/INH inhaler Inhale 1 puff daily Do not start before October 26, 2022  Qty: 60 blister, Refills: 0    Associated Diagnoses: COPD exacerbation ([address] )         STOP taking these medications       azithromycin (ZITHROMAX) 500 MG tablet Comments:   Reason for Stopping:               No discharge procedures on file      PDMP Review     None          ED Provider  Electronically Signed by           Eva Rodríguez MD  10/30/22 6167

## 2022-10-30 NOTE — ASSESSMENT & PLAN NOTE
· Troponin 72 on admission; was 312 on recent discharge so appears to be improving   · EKG showing some ST depressions possibly new from prior?   · Denies chest pain  · Evaluated by cardiology during prior admission w/ injury thought to be due to hypoxia/demand ischemia  · Tele monitoring   · Trend remainder of troponin/EKG

## 2022-10-30 NOTE — RESPIRATORY THERAPY NOTE
RT Protocol Note  Linda Carmen Colon 78 y o  female MRN: [id number]  Unit/Bed#: -01 Encounter: [id number]    Assessment    Principal Problem:    Acute respiratory failure with hypoxia [facility])  Active Problems:    COPD with acute exacerbation ([facility] )    Primary hypertension    Myocardial injury    Tobacco abuse disorder    Hyperlipemia      Home Pulmonary Medications:     10/29/22 2320   Respiratory Protocol   Protocol Initiated? Yes   Protocol Selection Respiratory; Airway Clearance   Language Barrier? No   Medical & Social History Reviewed? Yes   Diagnostic Studies Reviewed? Yes   Physical Assessment Performed? Yes   Respiratory Plan Moderate/Severe Distress pathway   Airway Clearance Plan Incentive Spirometer   Respiratory Assessment   Assessment Type Assess only   General Appearance Alert; Awake   Respiratory Pattern Normal   Chest Assessment Chest expansion symmetrical   Bilateral Breath Sounds Diminished; Expiratory wheezes   Location Specific No   Cough None   Resp Comments Resp protocol completed  Pt states that she is breathing much better after 1 hour long treatment in the ED  will continue to monitor and continue with current tx plan   O2 Device NC 3lpm   Cough Description   Sputum Amount None   Additional Assessments   Pulse 69   Respirations 18   SpO2 91 %          Past Medical History:   Diagnosis Date    COPD (chronic obstructive pulmonary disease) ([facility] )     Hyperlipemia     Hypertension      Social History     Socioeconomic History    Marital status:       Spouse name: None    Number of children: None    Years of education: None    Highest education level: None   Occupational History    None   Tobacco Use    Smoking status: Current Every Day Smoker     Packs/day: 1 00    Smokeless tobacco: Never Used   Vaping Use    Vaping Use: Never used   Substance and Sexual Activity    Alcohol use: Yes    Drug use: Never    Sexual activity: Not Currently   Other Topics Concern    None   Social History Narrative    None     Social Determinants of Health     Financial Resource Strain: Not on file   Food Insecurity: Not on file   Transportation Needs: Not on file   Physical Activity: Not on file   Stress: Not on file   Social Connections: Not on file   Intimate Partner Violence: Not on file   Housing Stability: Not on file       Subjective         Objective    Physical Exam:   Assessment Type: Assess only  General Appearance: Alert, Awake  Respiratory Pattern: Normal  Chest Assessment: Chest expansion symmetrical  Bilateral Breath Sounds: Diminished, Expiratory wheezes  Location Specific: No  Cough: None  O2 Device: NC 3lpm    Vitals:  Blood pressure 131/73, pulse 69, temperature 98 3 °F (36 8 °C), resp  rate 18, SpO2 91 %  Imaging and other studies: I have personally reviewed pertinent reports  O2 Device: NC 3lpm     Plan    Respiratory Plan: Moderate/Severe Distress pathway  Airway Clearance Plan: Incentive Spirometer     Resp Comments: Resp protocol completed   Pt states that she is breathing much better after 1 hour long treatment in the ED  will continue to monitor and continue with current tx plan

## 2022-10-30 NOTE — ASSESSMENT & PLAN NOTE
· Troponin trending upwards , continue to monitor  · 72 - > 314 - >460  · Twelve lead EKG notes normal sinus rhythm with incomplete right bundle-branch block and nonspecific ST abnormalities (appears unchanged from EKG on 10/24)  · During recent admission she was seen by Cardiology and echo was done which noted EF 60% with grade 1 diastolic dysfunction and no wall motion abnormality  · Currently denies any active chest pain  · Plan will remain to continue cycle troponin until it peaks  · Continue with aspirin/statin  · Monitor on telemetry

## 2022-10-30 NOTE — CASE MANAGEMENT
Case Management Assessment & Discharge Planning Note    Patient name Michael Dukse Colon  Location /-01 MRN [id number]  : 1955 Date 10/30/2022       Current Admission Date: 10/29/2022  Current Admission Diagnosis:Acute respiratory failure with hypoxia [facility])   Patient Active Problem List    Diagnosis Date Noted   • Hyperlipemia    • Tobacco abuse disorder 10/25/2022   • Acute respiratory failure with hypoxia ([facility] ) 10/24/2022   • Thyroid nodule 10/24/2022   • COPD with acute exacerbation ([facility] ) 10/24/2022   • Primary hypertension 10/24/2022   • Myocardial injury 10/24/2022      LOS (days): 0  Geometric Mean LOS (GMLOS) (days):   Days to GMLOS:     OBJECTIVE:              Current admission status: Observation       Preferred Pharmacy:   [facility] [address] [place] [address] [address]  [facility] [address] [facility]  Phone: [phone] Fax: [phone]    Primary Care Provider: Sue Valdez MD    Primary Insurance: MEDICARE  Secondary Insurance:     ASSESSMENT:  Is[address] 26 Proxies    There are no active Health Care Proxies on file  Obs Notice Signed: 10/30/22    Readmission Root Cause  30 Day Readmission: No    Patient Information  Admitted from[de-identified] Home  Mental Status: Alert  During Assessment patient was accompanied by: Not accompanied during assessment  Assessment information provided by[de-identified] Patient  Primary Caregiver: Self  Support Systems: Son  [place] of Residence: [place] [address] do you live in?: [facility] entry access options   Select all that apply : No steps to enter home  Type of Current Residence: Apartment  Floor Level: 1  Upon entering residence, is there a bedroom on the main floor (no further steps)?: Yes  Upon entering residence, is there a bathroom on the main floor (no further steps)?: Yes  In the last 12 months, was there a time when you were not able to pay the mortgage or rent on time?: No  In the last 12 months, how many places have you lived?: 1  In the last 12 months, was there a time when you did not have a steady place to sleep or slept in a shelter (including now)?: No  Homeless/housing insecurity resource given?: N/A  Living Arrangements: Lives w/ Son  Is patient a ?: No    Activities of Daily Living Prior to Admission  Functional Status: Independent  Completes ADLs independently?: Yes  Does patient use assisted devices?: No  Does patient currently own DME?: No  Does patient have a history of Outpatient Therapy (PT/OT)?: No  Does the patient have a history of Short-Term Rehab?: No  Does patient have a history of HHC?: No  Does patient currently have [address]?: No         Patient Information Continued  Income Source: Pension/half-way  Does patient have prescription coverage?: Yes  Within the past 12 months, you worried that your food would run out before you got the money to buy more : Never true  Within the past 12 months, the food you bought just didn't last and you didn't have money to get more : Never true  Food insecurity resource given?: N/A  Does patient receive dialysis treatments?: No  Does patient have a history of substance abuse?: No  Does patient have a history of Mental Health Diagnosis?: No         Means of Transportation  Means of Transport to Appts[de-identified] Family transport  In the past 12 months, has lack of transportation kept you from medical appointments or from getting medications?: No  In the past 12 months, has lack of transportation kept you from meetings, work, or from getting things needed for daily living?: No  Was application for public transport provided?: N/A        DISCHARGE DETAILS:    Discharge planning discussed with[de-identified] bedside wtih pt  Freedom of Choice: Yes  Comments - Freedom of Choice: No afterare reccoemndations at this time  Discussed FOC  CM contacted family/caregiver?: No- see comments (Pt has already spoken with son   Pt is oriented)             Contacts  Patient Contacts: Merrill Hankins Colon  Relationship to Patient[de-identified] Family (Son, DIL)  Contact Method: Phone  Phone Number: [phone]  Reason/Outcome: Emergency Contact, Discharge Planning     Transport at Discharge : Family          Additional Comments: CM spoke wit pt bedside  Pt resides with her son  Pt is transported to and from appts by her son or DIL  Pt son is over the road rig  and works [employer] per day  PT has a zero entry apt building and was IPTA   Pt has never used DME or had been in STR

## 2022-10-30 NOTE — PROGRESS NOTES
[address] [facility]  Progress Note Gaviota Torres Colon 1955, 78 y o  female MRN: [id number]  Unit/Bed#: -01 Encounter: [id number]  Primary Care Provider: Kenney Romero MD   Date and time admitted to hospital: 10/29/2022  8:46 PM    * Acute respiratory failure with hypoxia [facility])  Assessment & Plan   ** recently discharged on 10/25 following stay for COPD exacerbation/demand ischemia  Reports she was doing well initially however worsening shortness of breath which began 2 days prior to admission associated with increased productive cough  · Was 80%O2 RA on ED arrival  · Currently 95%O2 3L NC  · Experiencing COPD exacerbation as below  · Continue supplemental oxygen; wean as appropriate    COPD with acute exacerbation (HCC)  Assessment & Plan  · Recent discharge on 10/25 following a stay for the same  · Patient reports she has not been able to take her newly prescribed Anoro at home due to cost  · Reports some improvement in symptoms after neb treatments in ED    -Continue home Anoro  -TID Xopenex and atrovent ordered   -Was on prednisone taper; transitioned back to solumedrol 40mg q8H  -Continue Azithromycin   -Incentive spirometry and pulmonary toileting   -Monitor o2 levels closely, cont to wean    Myocardial injury  Assessment & Plan  · Troponin trending upwards , continue to monitor  · 72 - > 314 - >460  · Twelve lead EKG notes normal sinus rhythm with incomplete right bundle-branch block and nonspecific ST abnormalities (appears unchanged from EKG on 10/24)  · During recent admission she was seen by Cardiology and echo was done which noted EF 60% with grade 1 diastolic dysfunction and no wall motion abnormality  · Currently denies any active chest pain  · Plan will remain to continue cycle troponin until it peaks  · Continue with aspirin/statin  · Monitor on telemetry      Hyperlipemia  Assessment & Plan  · Continue home statin    Tobacco abuse disorder  Assessment & Plan  · Contributing to patient's poor respiratory status as above  · Recommend complete cessation  · Nicotine patch ordered     Primary hypertension  Assessment & Plan  · Hypertensive urgency upon presentation which has now resolved  Recent blood pressure 143/79  · Continue home lisinopril  · Monitor BP per unit protocol       VTE Pharmacologic Prophylaxis:   Pharmacologic: Enoxaparin (Lovenox)  Mechanical VTE Prophylaxis in Place: Yes    Patient Centered Rounds: I have performed bedside rounds with nursing staff today  Education and Discussions with Family / Patient: dw patient    Time Spent for Care: 30 minutes  More than 50% of total time spent on counseling and coordination of care as described above  Current Length of Stay: 0 day(s)    Current Patient Status: Observation   Certification Statement: The patient will continue to require additional inpatient hospital stay due to treatment of copd exac and monitoring troponin levels    Discharge Plan: undetermined    Code Status: Level 1 - Full Code      Subjective:   Patient admitted overnight due to worsening shortness of breath and increased cough/mucus production  Patient denying any chest pain this morning  No palpitations lightheadedness dizziness  No fevers/chills  She does report improvement since admission to the hospital     Objective:     Vitals:   Temp (24hrs), Av 8 °F (36 6 °C), Min:97 5 °F (36 4 °C), Max:98 3 °F (36 8 °C)    Temp:  [97 5 °F (36 4 °C)-98 3 °F (36 8 °C)] 97 5 °F (36 4 °C)  HR:  [55-80] 61  Resp:  [16-27] 16  BP: (131-201)/(67-94) 137/67  SpO2:  [90 %-98 %] 96 %  There is no height or weight on file to calculate BMI  Input and Output Summary (last 24 hours):     No intake or output data in the 24 hours ending 10/30/22 1109    Physical Exam:     Physical Exam  Constitutional:       General: She is not in acute distress  Appearance: She is obese  She is not toxic-appearing     HENT:      Mouth/Throat:      Mouth: Mucous membranes are moist       Pharynx: Oropharynx is clear  Cardiovascular:      Rate and Rhythm: Normal rate and regular rhythm  Pulses: Normal pulses  Pulmonary:      Breath sounds: No wheezing  Comments: Diminished bs bilaterally  Abdominal:      General: Abdomen is flat  Bowel sounds are normal       Palpations: Abdomen is soft  Musculoskeletal:      Right lower leg: No edema  Left lower leg: No edema  Neurological:      General: No focal deficit present  Mental Status: She is alert and oriented to person, place, and time  Additional Data:     Labs:    Results from last 7 days   Lab Units 10/30/22  0510 10/29/22  2106   WBC Thousand/uL 8 45 12 21*   HEMOGLOBIN g/dL 15 2 16 3*   HEMATOCRIT % 47 5* 50 1*   PLATELETS Thousands/uL 129* 153   NEUTROS PCT %  --  75   LYMPHS PCT %  --  14   LYMPHO PCT % 3*  --    MONOS PCT %  --  8   MONO PCT % 1*  --    EOS PCT % 0 3     Results from last 7 days   Lab Units 10/30/22  0510 10/29/22  2106   SODIUM mmol/L 141 138   POTASSIUM mmol/L 4 9 3 3*   CHLORIDE mmol/L 102 99   CO2 mmol/L 29 32   BUN mg/dL 12 14   CREATININE mg/dL 0 62 0 78   ANION GAP mmol/L 10 7   CALCIUM mg/dL 8 9 8 7   ALBUMIN g/dL  --  3 5   TOTAL BILIRUBIN mg/dL  --  1 03*   ALK PHOS U/L  --  93   ALT U/L  --  101*   AST U/L  --  64*   GLUCOSE RANDOM mg/dL 195* 134                 Results from last 7 days   Lab Units 10/30/22  0510 10/24/22  0734   PROCALCITONIN ng/ml 0 05 0 05           * I Have Reviewed All Lab Data Listed Above  * Additional Pertinent Lab Tests Reviewed:  All Labs Within Last 24 Hours Reviewed    Recent Cultures (last 7 days):           Last 24 Hours Medication List:   Current Facility-Administered Medications   Medication Dose Route Frequency Provider Last Rate   • acetaminophen  650 mg Oral Q6H PRN Merry Guerrero PA-C     • ALPRAZolam  0 25 mg Oral BID PRN Merry Guerrero PA-C     • aspirin  81 mg Oral Daily Merry Guerrero PA-C     • atorvastatin  40 mg Oral Daily [place], [place]     • azithromycin  500 mg Oral Q24H [place], [place]     • enoxaparin  40 mg Subcutaneous Daily [place], [place]     • furosemide  20 mg Intravenous Once Annie Cantrell MD     • guaiFENesin  600 mg Oral Q12H [place], [place]     • ipratropium  0 5 mg Nebulization TID [facility]     • levalbuterol  1 25 mg Nebulization TID [facility] TE[facility] [place]     • lisinopril  20 mg Oral Daily [place], [place]     • methylPREDNISolone sodium succinate  40 mg Intravenous Q8H [place], [place]     • nicotine  1 patch Transdermal Daily [place], [place]     • umeclidinium-vilanterol  1 puff Inhalation Daily [place], [place]          Today, Patient Was Seen By: SAVI Mendes      ** Please Note: Dictation voice to text software may have been used in the creation of this document   **

## 2022-10-30 NOTE — ASSESSMENT & PLAN NOTE
· Hypertensive urgency upon presentation which has now resolved  Recent blood pressure 143/79    · Continue home lisinopril  · Monitor BP per unit protocol

## 2022-10-30 NOTE — PLAN OF CARE
Problem: Potential for Falls  Goal: Patient will remain free of falls  Description: INTERVENTIONS:  - Educate patient/family on patient safety including physical limitations  - Instruct patient to call for assistance with activity   - Consult OT/PT to assist with strengthening/mobility   - Keep Call bell within reach  - Keep bed low and locked with side rails adjusted as appropriate  - Keep care items and personal belongings within reach  - Initiate and maintain comfort rounds  - Make Fall Risk Sign visible to staff  - Offer Toileting every 2 Hours, in advance of need  - Initiate/Maintain Bed alarm  - Obtain necessary fall risk management equipment: Bed Alarm  - Apply yellow socks and bracelet for high fall risk patients  - Consider moving patient to room near nurses station  Outcome: Progressing     Problem: Nutrition/Hydration-ADULT  Goal: Nutrient/Hydration intake appropriate for improving, restoring or maintaining nutritional needs  Description: Monitor and assess patient's nutrition/hydration status for malnutrition  Collaborate with interdisciplinary team and initiate plan and interventions as ordered  Monitor patient's weight and dietary intake as ordered or per policy  Utilize nutrition screening tool and intervene as necessary  Determine patient's food preferences and provide high-protein, high-caloric foods as appropriate       INTERVENTIONS:  - Monitor oral intake, urinary output, labs, and treatment plans  - Assess nutrition and hydration status and recommend course of action  - Evaluate amount of meals eaten  - Assist patient with eating if necessary   - Allow adequate time for meals  - Recommend/ encourage appropriate diets, oral nutritional supplements, and vitamin/mineral supplements  - Order, calculate, and assess calorie counts as needed  - Recommend, monitor, and adjust tube feedings and TPN/PPN based on assessed needs  - Assess need for intravenous fluids  - Provide specific nutrition/hydration education as appropriate  - Include patient/family/caregiver in decisions related to nutrition  Outcome: Progressing

## 2022-10-30 NOTE — ASSESSMENT & PLAN NOTE
· Recent discharge on 10/25 following a stay for the same  · Patient reports she has not been able to take her newly prescribed Anoro at home due to cost  · Reports some improvement in symptoms after neb treatments in ED    -Continue home Anoro  -TID Xopenex and atrovent ordered   -Was on prednisone taper; transitioned back to solumedrol 40mg q8H  -Continue Azithromycin   -Incentive spirometry and pulmonary toileting   -Monitor o2 levels closely, cont to wean

## 2022-10-30 NOTE — CASE MANAGEMENT
Case Management Assessment & Discharge Planning Note    Patient name Lei Flank Colon  Location /-01 MRN [id number]  : 1955 Date 10/30/2022       Current Admission Date: 10/29/2022  Current Admission Diagnosis:Acute respiratory failure with hypoxia [facility])   Patient Active Problem List    Diagnosis Date Noted   • Hyperlipemia    • Tobacco abuse disorder 10/25/2022   • Acute respiratory failure with hypoxia ([facility] ) 10/24/2022   • Thyroid nodule 10/24/2022   • COPD with acute exacerbation ([facility] ) 10/24/2022   • Primary hypertension 10/24/2022   • Myocardial injury 10/24/2022      LOS (days): 0  Geometric Mean LOS (GMLOS) (days):   Days to GMLOS:     OBJECTIVE:              Current admission status: Observation       Preferred Pharmacy:   [place] [address]  [place] [address] [facility]  Phone: [phone] Fax: [phone]    Primary Care Provider: Pam Segal MD    Primary Insurance: MEDICARE  Secondary Insurance:     ASSESSMENT:  Isai 26 Proxies    There are no active Health Care Proxies on file              Obs Notice Signed: 10/30/22    DISCHARGE DETAILS:

## 2022-10-31 LAB
ANION GAP SERPL CALCULATED.3IONS-SCNC: 9 MMOL/L (ref 4–13)
BASOPHILS # BLD AUTO: 0.01 THOUSANDS/ÂΜL (ref 0–0.1)
BASOPHILS NFR BLD AUTO: 0 % (ref 0–1)
BUN SERPL-MCNC: 18 MG/DL (ref 5–25)
CALCIUM SERPL-MCNC: 9.2 MG/DL (ref 8.3–10.1)
CHLORIDE SERPL-SCNC: 101 MMOL/L (ref 96–108)
CO2 SERPL-SCNC: 29 MMOL/L (ref 21–32)
CREAT SERPL-MCNC: 0.9 MG/DL (ref 0.6–1.3)
EOSINOPHIL # BLD AUTO: 0 THOUSAND/ÂΜL (ref 0–0.61)
EOSINOPHIL NFR BLD AUTO: 0 % (ref 0–6)
ERYTHROCYTE [DISTWIDTH] IN BLOOD BY AUTOMATED COUNT: 14.7 % (ref 11.6–15.1)
GFR SERPL CREATININE-BSD FRML MDRD: 66 ML/MIN/1.73SQ M
GLUCOSE SERPL-MCNC: 288 MG/DL (ref 65–140)
HCT VFR BLD AUTO: 48.6 % (ref 34.8–46.1)
HGB BLD-MCNC: 15.7 G/DL (ref 11.5–15.4)
IMM GRANULOCYTES # BLD AUTO: 0.08 THOUSAND/UL (ref 0–0.2)
IMM GRANULOCYTES NFR BLD AUTO: 1 % (ref 0–2)
LYMPHOCYTES # BLD AUTO: 0.75 THOUSANDS/ÂΜL (ref 0.6–4.47)
LYMPHOCYTES NFR BLD AUTO: 5 % (ref 14–44)
MCH RBC QN AUTO: 35.2 PG (ref 26.8–34.3)
MCHC RBC AUTO-ENTMCNC: 32.3 G/DL (ref 31.4–37.4)
MCV RBC AUTO: 109 FL (ref 82–98)
MONOCYTES # BLD AUTO: 0.72 THOUSAND/ÂΜL (ref 0.17–1.22)
MONOCYTES NFR BLD AUTO: 5 % (ref 4–12)
NEUTROPHILS # BLD AUTO: 12.67 THOUSANDS/ÂΜL (ref 1.85–7.62)
NEUTS SEG NFR BLD AUTO: 89 % (ref 43–75)
NRBC BLD AUTO-RTO: 0 /100 WBCS
PLATELET # BLD AUTO: 153 THOUSANDS/UL (ref 149–390)
PMV BLD AUTO: 12.5 FL (ref 8.9–12.7)
POTASSIUM SERPL-SCNC: 4.7 MMOL/L (ref 3.5–5.3)
RBC # BLD AUTO: 4.46 MILLION/UL (ref 3.81–5.12)
SODIUM SERPL-SCNC: 139 MMOL/L (ref 135–147)
WBC # BLD AUTO: 14.23 THOUSAND/UL (ref 4.31–10.16)

## 2022-10-31 RX ORDER — METHYLPREDNISOLONE SODIUM SUCCINATE 40 MG/ML
40 INJECTION, POWDER, LYOPHILIZED, FOR SOLUTION INTRAMUSCULAR; INTRAVENOUS EVERY 12 HOURS SCHEDULED
Status: DISCONTINUED | OUTPATIENT
Start: 2022-10-31 | End: 2022-11-01

## 2022-10-31 RX ORDER — METHYLPREDNISOLONE SODIUM SUCCINATE 40 MG/ML
INJECTION, POWDER, LYOPHILIZED, FOR SOLUTION INTRAMUSCULAR; INTRAVENOUS
Status: DISCONTINUED
Start: 2022-10-31 | End: 2022-10-31 | Stop reason: WASHOUT

## 2022-10-31 RX ADMIN — LISINOPRIL 20 MG: 20 TABLET ORAL at 09:06

## 2022-10-31 RX ADMIN — AZITHROMYCIN 500 MG: 500 TABLET, FILM COATED ORAL at 20:55

## 2022-10-31 RX ADMIN — GUAIFENESIN 600 MG: 600 TABLET, EXTENDED RELEASE ORAL at 09:05

## 2022-10-31 RX ADMIN — LEVALBUTEROL HYDROCHLORIDE 1.25 MG: 1.25 SOLUTION, CONCENTRATE RESPIRATORY (INHALATION) at 07:09

## 2022-10-31 RX ADMIN — METHYLPREDNISOLONE SODIUM SUCCINATE 40 MG: 40 INJECTION, POWDER, FOR SOLUTION INTRAMUSCULAR; INTRAVENOUS at 20:55

## 2022-10-31 RX ADMIN — ENOXAPARIN SODIUM 40 MG: 40 INJECTION SUBCUTANEOUS at 09:13

## 2022-10-31 RX ADMIN — LEVALBUTEROL HYDROCHLORIDE 1.25 MG: 1.25 SOLUTION, CONCENTRATE RESPIRATORY (INHALATION) at 19:58

## 2022-10-31 RX ADMIN — SALINE NASAL SPRAY 1 SPRAY: 1.5 SOLUTION NASAL at 06:48

## 2022-10-31 RX ADMIN — ASPIRIN 81 MG: 81 TABLET, COATED ORAL at 09:05

## 2022-10-31 RX ADMIN — UMECLIDINIUM BROMIDE AND VILANTEROL TRIFENATATE 1 PUFF: 62.5; 25 POWDER RESPIRATORY (INHALATION) at 10:00

## 2022-10-31 RX ADMIN — ATORVASTATIN CALCIUM 40 MG: 40 TABLET, FILM COATED ORAL at 09:05

## 2022-10-31 RX ADMIN — METHYLPREDNISOLONE SODIUM SUCCINATE 40 MG: 40 INJECTION, POWDER, FOR SOLUTION INTRAMUSCULAR; INTRAVENOUS at 06:48

## 2022-10-31 RX ADMIN — IPRATROPIUM BROMIDE 0.5 MG: 0.5 SOLUTION RESPIRATORY (INHALATION) at 07:09

## 2022-10-31 RX ADMIN — LEVALBUTEROL HYDROCHLORIDE 1.25 MG: 1.25 SOLUTION, CONCENTRATE RESPIRATORY (INHALATION) at 13:34

## 2022-10-31 RX ADMIN — IPRATROPIUM BROMIDE 0.5 MG: 0.5 SOLUTION RESPIRATORY (INHALATION) at 13:34

## 2022-10-31 RX ADMIN — IPRATROPIUM BROMIDE 0.5 MG: 0.5 SOLUTION RESPIRATORY (INHALATION) at 19:58

## 2022-10-31 RX ADMIN — GUAIFENESIN 600 MG: 600 TABLET, EXTENDED RELEASE ORAL at 20:55

## 2022-10-31 NOTE — PLAN OF CARE
Problem: METABOLIC, FLUID AND ELECTROLYTES - ADULT  Goal: Fluid balance maintained  Description: INTERVENTIONS:  - Monitor labs   - Monitor I/O and WT  - Instruct patient on fluid and nutrition as appropriate  - Assess for signs & symptoms of volume excess or deficit  Outcome: Progressing     Problem: METABOLIC, FLUID AND ELECTROLYTES - ADULT  Goal: Electrolytes maintained within normal limits  Description: INTERVENTIONS:  - Monitor labs and assess patient for signs and symptoms of electrolyte imbalances  - Administer electrolyte replacement as ordered  - Monitor response to electrolyte replacements, including repeat lab results as appropriate  - Instruct patient on fluid and nutrition as appropriate  Outcome: Progressing     Problem: RESPIRATORY - ADULT  Goal: Achieves optimal ventilation and oxygenation  Description: INTERVENTIONS:  - Assess for changes in respiratory status  - Assess for changes in mentation and behavior  - Position to facilitate oxygenation and minimize respiratory effort  - Oxygen administered by appropriate delivery if ordered  - Initiate smoking cessation education as indicated  - Encourage broncho-pulmonary hygiene including cough, deep breathe, Incentive Spirometry  - Assess the need for suctioning and aspirate as needed  - Assess and instruct to report SOB or any respiratory difficulty  - Respiratory Therapy support as indicated  Outcome: Progressing

## 2022-10-31 NOTE — PLAN OF CARE
Problem: Potential for Falls  Goal: Patient will remain free of falls  Description: INTERVENTIONS:  - Educate patient/family on patient safety including physical limitations  - Instruct patient to call for assistance with activity   - Consult OT/PT to assist with strengthening/mobility   - Keep Call bell within reach  - Keep bed low and locked with side rails adjusted as appropriate  - Keep care items and personal belongings within reach  - Initiate and maintain comfort rounds  - Make Fall Risk Sign visible to staff  - Offer Toileting every 2 Hours, in advance of need  - Initiate/Maintain Bed alarm  - Obtain necessary fall risk management equipment: Bed alarm  - Apply yellow socks and bracelet for high fall risk patients  - Consider moving patient to room near nurses station  Outcome: Progressing     Problem: Nutrition/Hydration-ADULT  Goal: Nutrient/Hydration intake appropriate for improving, restoring or maintaining nutritional needs  Description: Monitor and assess patient's nutrition/hydration status for malnutrition  Collaborate with interdisciplinary team and initiate plan and interventions as ordered  Monitor patient's weight and dietary intake as ordered or per policy  Utilize nutrition screening tool and intervene as necessary  Determine patient's food preferences and provide high-protein, high-caloric foods as appropriate       INTERVENTIONS:  - Monitor oral intake, urinary output, labs, and treatment plans  - Assess nutrition and hydration status and recommend course of action  - Evaluate amount of meals eaten  - Assist patient with eating if necessary   - Allow adequate time for meals  - Recommend/ encourage appropriate diets, oral nutritional supplements, and vitamin/mineral supplements  - Order, calculate, and assess calorie counts as needed  - Recommend, monitor, and adjust tube feedings and TPN/PPN based on assessed needs  - Assess need for intravenous fluids  - Provide specific nutrition/hydration education as appropriate  - Include patient/family/caregiver in decisions related to nutrition  Outcome: Progressing     Problem: RESPIRATORY - ADULT  Goal: Achieves optimal ventilation and oxygenation  Description: INTERVENTIONS:  - Assess for changes in respiratory status  - Assess for changes in mentation and behavior  - Position to facilitate oxygenation and minimize respiratory effort  - Oxygen administered by appropriate delivery if ordered  - Initiate smoking cessation education as indicated  - Encourage broncho-pulmonary hygiene including cough, deep breathe, Incentive Spirometry  - Assess the need for suctioning and aspirate as needed  - Assess and instruct to report SOB or any respiratory difficulty  - Respiratory Therapy support as indicated  Outcome: Progressing     Problem: METABOLIC, FLUID AND ELECTROLYTES - ADULT  Goal: Electrolytes maintained within normal limits  Description: INTERVENTIONS:  - Monitor labs and assess patient for signs and symptoms of electrolyte imbalances  - Administer electrolyte replacement as ordered  - Monitor response to electrolyte replacements, including repeat lab results as appropriate  - Instruct patient on fluid and nutrition as appropriate  Outcome: Progressing  Goal: Fluid balance maintained  Description: INTERVENTIONS:  - Monitor labs   - Monitor I/O and WT  - Instruct patient on fluid and nutrition as appropriate  - Assess for signs & symptoms of volume excess or deficit  Outcome: Progressing

## 2022-10-31 NOTE — ASSESSMENT & PLAN NOTE
· /61   Pulse 62   Temp 98 8 °F (37 1 °C)   Resp 16   SpO2 93%   · Stable pressures now  · Hypertensive urgency upon presentation which has now resolved  Continue home lisinopril with holding parameter  · Monitor BP per unit protocol

## 2022-10-31 NOTE — ASSESSMENT & PLAN NOTE
· Troponin peaked at 555  No chest pain  · Twelve lead EKG notes normal sinus rhythm with incomplete right bundle-branch block and nonspecific ST abnormalities (appears unchanged from EKG on 10/24)  · During recent admission she was seen by Cardiology and echo was done which noted EF 60% with grade 1 diastolic dysfunction and no wall motion abnormality  · Currently denies any active chest pain  · Continue with aspirin/statin  · Monitor on telemetry

## 2022-10-31 NOTE — ASSESSMENT & PLAN NOTE
· Recent discharge on 10/25 following hospitalization for COPD  · Patient reports she has not been able to take her newly prescribed Anoro at home due to cost   Will check give Orest Pepper is covered  · Reports some improvement in symptoms after neb treatments in ED  · TID Xopenex and atrovent ordered   · Was on prednisone taper; transitioned back to solumedrol 40mg q8H  · Continue Azithromycin   · Incentive spirometry and pulmonary toileting   · Monitor o2 levels closely, cont to wean

## 2022-10-31 NOTE — PROGRESS NOTES
[facility]  Progress Note Pako Mckay Colon 1955, 78 y o  female MRN: [id number]  Unit/Bed#: -01 Encounter: [id number]  Primary Care Provider: Son Hamilton MD   Date and time admitted to hospital: 10/29/2022  8:46 PM    * COPD with acute exacerbation [facility])  Assessment & Plan  · Recent discharge on 10/25 following hospitalization for COPD  · Patient reports she has not been able to take her newly prescribed Anoro at home due to cost   Will check give Lev Proctor is covered  · Reports some improvement in symptoms after neb treatments in ED  · TID Xopenex and atrovent ordered   · Was on prednisone taper; transitioned back to solumedrol 40mg q8H  · Continue Azithromycin   · Incentive spirometry and pulmonary toileting   · Monitor o2 levels closely, cont to wean    Acute respiratory failure with hypoxia (HCC)  Assessment & Plan  · Home oxygen evaluation ordered  ·  Recently discharged on 10/25 following stay for COPD exacerbation/demand ischemia  Reports she was doing well initially however worsening shortness of breath which began 2 days prior to admission associated with increased productive cough  · Was 80%O2 RA on ED arrival  · Currently 93-96% O2 2L NC  · Experiencing COPD exacerbation as below  · Continue supplemental oxygen; wean as appropriate    Hyperlipemia  Assessment & Plan  · Continue home statin  · Outpatient lipid panel    Tobacco abuse disorder  Assessment & Plan  · Contributing to patient's poor respiratory status as above  · Counseled for complete cessation  · Nicotine patch ordered     Myocardial injury  Assessment & Plan  · Troponin peaked at 555    No chest pain  · Twelve lead EKG notes normal sinus rhythm with incomplete right bundle-branch block and nonspecific ST abnormalities (appears unchanged from EKG on 10/24)  · During recent admission she was seen by Cardiology and echo was done which noted EF 60% with grade 1 diastolic dysfunction and no wall motion abnormality  · Currently denies any active chest pain  · Continue with aspirin/statin  · Monitor on telemetry  Primary hypertension  Assessment & Plan  · /61   Pulse 62   Temp 98 8 °F (37 1 °C)   Resp 16   SpO2 93%   · Stable pressures now  · Hypertensive urgency upon presentation which has now resolved  Continue home lisinopril with holding parameter  · Monitor BP per unit protocol         VTE Pharmacologic Prophylaxis: VTE Score: 6 Lovenox    Patient Centered Rounds: I performed bedside rounds with nursing staff today  Discussions with Specialists or Other Care Team Provider:  N/a    Education and Discussions with Family / Patient: Discussed with patient's son at bedside  Time Spent for Care: 20 minutes  More than 50% of total time spent on counseling and coordination of care as described above  Current Length of Stay: 1 day(s)  Current Patient Status: Inpatient   Certification Statement: The patient will continue to require additional inpatient hospital stay due to Need for IV steroids  Discharge Plan: Anticipate discharge in 24-48 hrs to home  Code Status: Level 1 - Full Code    Subjective:   Seen and examined at bedside  Shortness of breath on exertion  Feels better since admission  No chest pain    Objective:     Vitals:   Temp (24hrs), Av 6 °F (37 °C), Min:97 9 °F (36 6 °C), Max:99 5 °F (37 5 °C)    Temp:  [97 9 °F (36 6 °C)-99 5 °F (37 5 °C)] 98 8 °F (37 1 °C)  HR:  [62-74] 62  Resp:  [16-18] 16  BP: (116-142)/(61-74) 116/61  SpO2:  [93 %-96 %] 93 %  There is no height or weight on file to calculate BMI  Input and Output Summary (last 24 hours):      Intake/Output Summary (Last 24 hours) at 10/31/2022 1511  Last data filed at 10/31/2022 1307  Gross per 24 hour   Intake 560 ml   Output 2320 ml   Net -1760 ml       Physical Exam:   Physical Exam S1-S2 audible, regular  Lungs exhibit diminished air entry bilaterally with scattered wheezing  No pedal edema  Alert and oriented time place and person  Abdomen nontender nondistended bowel sounds audible all 4 quadrants  NC/AT  Currently on nasal cannula    Additional Data:     Labs:  Results from last 7 days   Lab Units 10/31/22  0900   WBC Thousand/uL 14 23*   HEMOGLOBIN g/dL 15 7*   HEMATOCRIT % 48 6*   PLATELETS Thousands/uL 153   NEUTROS PCT % 89*   LYMPHS PCT % 5*   MONOS PCT % 5   EOS PCT % 0     Results from last 7 days   Lab Units 10/31/22  0900 10/30/22  0510 10/29/22  2106   SODIUM mmol/L 139   < > 138   POTASSIUM mmol/L 4 7   < > 3 3*   CHLORIDE mmol/L 101   < > 99   CO2 mmol/L 29   < > 32   BUN mg/dL 18   < > 14   CREATININE mg/dL 0 90   < > 0 78   ANION GAP mmol/L 9   < > 7   CALCIUM mg/dL 9 2   < > 8 7   ALBUMIN g/dL  --   --  3 5   TOTAL BILIRUBIN mg/dL  --   --  1 03*   ALK PHOS U/L  --   --  93   ALT U/L  --   --  101*   AST U/L  --   --  64*   GLUCOSE RANDOM mg/dL 288*   < > 134    < > = values in this interval not displayed  Results from last 7 days   Lab Units 10/30/22  0510   PROCALCITONIN ng/ml 0 05       Lines/Drains:  Invasive Devices  Report    Peripheral Intravenous Line  Duration           Peripheral IV 10/30/22 Right;Ventral (anterior) Forearm 1 day                      Imaging: No pertinent imaging reviewed      Recent Cultures (last 7 days):         Last 24 Hours Medication List:   Current Facility-Administered Medications   Medication Dose Route Frequency Provider Last Rate   • acetaminophen  650 mg Oral Q6H PRN Sumi Crowder PA-C     • ALPRAZolam  0 25 mg Oral BID PRN Sumi Crowder PA-C     • aspirin  81 mg Oral Daily [place], [place]     • atorvastatin  40 mg Oral Daily [place], [place]     • azithromycin  500 mg Oral Q24H [place], [place]     • enoxaparin  40 mg Subcutaneous Daily [place], [place]     • guaiFENesin  600 mg Oral Q12H [facility]     • ipratropium  0 5 mg Nebulization TID Luis Miguel Krause MD     • levalbuterol  1 25 mg Nebulization TID Estrella Beverly MD     • lisinopril  20 mg Oral Daily Jackie Flynn     • methylPREDNISolone sodium succinate  40 mg Intravenous Q12H [facility] Brianna Tompkins MD     • nicotine  1 patch Transdermal Daily Merry Guerrero PA-C     • sodium chloride  1 spray Each Nare Q1H PRN Estrella Beverly MD     • umeclidinium-vilanterol  1 puff Inhalation Daily Merry Guerrero PA-C          Today, Patient Was Seen By: Brianna Tompkins    **Please Note: This note may have been constructed using a voice recognition system  **

## 2022-10-31 NOTE — ASSESSMENT & PLAN NOTE
· Home oxygen evaluation ordered  ·  Recently discharged on 10/25 following stay for COPD exacerbation/demand ischemia  Reports she was doing well initially however worsening shortness of breath which began 2 days prior to admission associated with increased productive cough    · Was 80%O2 RA on ED arrival  · Currently 93-96% O2 2L NC  · Experiencing COPD exacerbation as below  · Continue supplemental oxygen; wean as appropriate

## 2022-10-31 NOTE — ASSESSMENT & PLAN NOTE
· Contributing to patient's poor respiratory status as above  · Counseled for complete cessation  · Nicotine patch ordered

## 2022-10-31 NOTE — NURSING NOTE
Pt was 94% on 2L prior to ambulation w/o oxygen  Patient ambulated around the unit x 1 oxygen saturation decreased to 89% once byt the heart rate increased a few times to 130-140 beats per minute during ambulation on the pulse oximetry

## 2022-11-01 RX ORDER — TIOTROPIUM BROMIDE AND OLODATEROL 3.124; 2.736 UG/1; UG/1
2 SPRAY, METERED RESPIRATORY (INHALATION) DAILY
Qty: 4 G | Refills: 0 | Status: SHIPPED | OUTPATIENT
Start: 2022-11-01

## 2022-11-01 RX ORDER — PREDNISONE 20 MG/1
40 TABLET ORAL DAILY
Status: DISCONTINUED | OUTPATIENT
Start: 2022-11-01 | End: 2022-11-02 | Stop reason: HOSPADM

## 2022-11-01 RX ADMIN — LEVALBUTEROL HYDROCHLORIDE 1.25 MG: 1.25 SOLUTION, CONCENTRATE RESPIRATORY (INHALATION) at 07:30

## 2022-11-01 RX ADMIN — ENOXAPARIN SODIUM 40 MG: 40 INJECTION SUBCUTANEOUS at 09:16

## 2022-11-01 RX ADMIN — PREDNISONE 40 MG: 20 TABLET ORAL at 17:03

## 2022-11-01 RX ADMIN — IPRATROPIUM BROMIDE 0.5 MG: 0.5 SOLUTION RESPIRATORY (INHALATION) at 13:46

## 2022-11-01 RX ADMIN — IPRATROPIUM BROMIDE 0.5 MG: 0.5 SOLUTION RESPIRATORY (INHALATION) at 19:20

## 2022-11-01 RX ADMIN — LEVALBUTEROL HYDROCHLORIDE 1.25 MG: 1.25 SOLUTION, CONCENTRATE RESPIRATORY (INHALATION) at 13:46

## 2022-11-01 RX ADMIN — METHYLPREDNISOLONE SODIUM SUCCINATE 40 MG: 40 INJECTION, POWDER, FOR SOLUTION INTRAMUSCULAR; INTRAVENOUS at 09:16

## 2022-11-01 RX ADMIN — GUAIFENESIN 600 MG: 600 TABLET, EXTENDED RELEASE ORAL at 22:08

## 2022-11-01 RX ADMIN — LISINOPRIL 20 MG: 20 TABLET ORAL at 09:15

## 2022-11-01 RX ADMIN — ASPIRIN 81 MG: 81 TABLET, COATED ORAL at 09:15

## 2022-11-01 RX ADMIN — GUAIFENESIN 600 MG: 600 TABLET, EXTENDED RELEASE ORAL at 09:15

## 2022-11-01 RX ADMIN — LEVALBUTEROL HYDROCHLORIDE 1.25 MG: 1.25 SOLUTION, CONCENTRATE RESPIRATORY (INHALATION) at 19:20

## 2022-11-01 RX ADMIN — AZITHROMYCIN 500 MG: 500 TABLET, FILM COATED ORAL at 22:08

## 2022-11-01 RX ADMIN — UMECLIDINIUM BROMIDE AND VILANTEROL TRIFENATATE 1 PUFF: 62.5; 25 POWDER RESPIRATORY (INHALATION) at 09:17

## 2022-11-01 RX ADMIN — IPRATROPIUM BROMIDE 0.5 MG: 0.5 SOLUTION RESPIRATORY (INHALATION) at 07:30

## 2022-11-01 RX ADMIN — ATORVASTATIN CALCIUM 40 MG: 40 TABLET, FILM COATED ORAL at 09:15

## 2022-11-01 NOTE — ASSESSMENT & PLAN NOTE
· Recent discharge on 10/25 following hospitalization for COPD  · Patient reports she has not been able to take her newly prescribed Anoro at home due to cost   Will check if STIOLTO  is covered  · Reports some improvement in symptoms after neb treatments in ED  · TID Xopenex and atrovent ordered   · Was on prednisone taper; transitioned back to solumedrol IV, Plan to switch to PO prednisone today and plan to DC tomorrow if stable  · Continue Azithromycin   · Incentive spirometry and pulmonary toileting   · Weaned off supplemental O2

## 2022-11-01 NOTE — PLAN OF CARE
Problem: Potential for Falls  Goal: Patient will remain free of falls  Description: INTERVENTIONS:  - Educate patient/family on patient safety including physical limitations  - Instruct patient to call for assistance with activity   - Consult OT/PT to assist with strengthening/mobility   - Keep Call bell within reach  - Keep bed low and locked with side rails adjusted as appropriate  - Keep care items and personal belongings within reach  - Initiate and maintain comfort rounds  - Make Fall Risk Sign visible to staff  - Initiate/Maintain  bed/chair alarm  - Apply yellow socks and bracelet for high fall risk patients  - Consider moving patient to room near nurses station  11/1/2022 1239 by Yonatan Knight, RN  Outcome: Progressing  11/1/2022 1239 by Yonatan Knight, RN  Outcome: Progressing

## 2022-11-01 NOTE — ASSESSMENT & PLAN NOTE
/78   Pulse 57   Temp 98 1 °F (36 7 °C)   Resp 15   Ht 5' 3" (1 6 m)   Wt 80 7 kg (177 lb 14 6 oz)   SpO2 94%   BMI 31 52 kg/m²   · Stable pressures  · Hypertensive urgency upon presentation which has now resolved  Continue home lisinopril with holding parameter  · Monitor BP per unit protocol

## 2022-11-01 NOTE — RESPIRATORY THERAPY NOTE
RT Protocol Note  Lawanna Apley Colon 78 y o  female MRN: [id number]  Unit/Bed#: -01 Encounter: [id number]    Assessment    Principal Problem:    COPD with acute exacerbation ([facility] [address] )  Active Problems:    Acute respiratory failure with hypoxia ([facility] [address] )    Primary hypertension    Myocardial injury    Tobacco abuse disorder    Hyperlipemia      Home Pulmonary Medications:     11/01/22 1920   Respiratory Protocol   Protocol Initiated? Yes   Protocol Selection Respiratory; Airway Clearance   Language Barrier? No   Medical & Social History Reviewed? Yes   Diagnostic Studies Reviewed? Yes   Physical Assessment Performed? Yes   Respiratory Plan Mild Distress pathway   Airway Clearance Plan Incentive Spirometer   Respiratory Assessment   Assessment Type During-treatment   General Appearance Alert; Awake   Respiratory Pattern Normal   Chest Assessment Chest expansion symmetrical   Bilateral Breath Sounds Diminished   R Breath Sounds Diminished   L Breath Sounds Diminished   Location Specific No   Cough Non-productive   Resp Comments Tx given with no resp distress noted   O2 Device RA   Cough Description   Sputum Amount None   Additional Assessments   Pulse 85   Respirations 16   SpO2 93 %     Home Devices/Therapy: Other (Comment) (alb hfa prn)    Past Medical History:   Diagnosis Date    COPD (chronic obstructive pulmonary disease) (HCC)     Hyperlipemia     Hypertension      Social History     Socioeconomic History    Marital status:      Spouse name: None    Number of children: None    Years of education: None    Highest education level: None   Occupational History    None   Tobacco Use    Smoking status: Current Every Day Smoker     Packs/day: 1 00    Smokeless tobacco: Never Used   Vaping Use    Vaping Use: Never used   Substance and Sexual Activity    Alcohol use:  Yes     Alcohol/week: 15 0 standard drinks     Types: 15 Shots of liquor per week    Drug use: Never    Sexual activity: Not Currently   Other Topics Concern    None   Social History Narrative    None     Social Determinants of Health     Financial Resource Strain: Not on file   Food Insecurity: No Food Insecurity    Worried About [facility] in the Last Year: Never true    Ran Out of Food in the Last Year: Never true   Transportation Needs: No Transportation Needs    Lack of Transportation (Medical): No    Lack of Transportation (Non-Medical): No   Physical Activity: Not on file   Stress: Not on file   Social Connections: Not on file   Intimate Partner Violence: Not on file   Housing Stability: Low Risk     Unable to Pay for Housing in the Last Year: No    Number of Places Lived in the Last Year: 1    Unstable Housing in the Last Year: No       Subjective         Objective    Physical Exam:   Assessment Type: During-treatment  General Appearance: Alert, Awake  Respiratory Pattern: Normal  Chest Assessment: Chest expansion symmetrical  Bilateral Breath Sounds: Diminished  R Breath Sounds: Diminished  L Breath Sounds: Diminished  Location Specific: No  Cough: Non-productive  O2 Device: RA    Vitals:  Blood pressure 148/79, pulse 85, temperature 98 1 °F (36 7 °C), resp  rate 16, height 5' 3" (1 6 m), weight 80 7 kg (177 lb 14 6 oz), SpO2 93 %  Imaging and other studies: I have personally reviewed pertinent reports        O2 Device: RA     Plan    Respiratory Plan: Mild Distress pathway  Airway Clearance Plan: Incentive Spirometer     Resp Comments: Tx given with no resp distress noted

## 2022-11-01 NOTE — PROGRESS NOTES
[facility]  Progress Note Cely Jurado Colon 1955, 78 y o  female MRN: [id number]  Unit/Bed#: -01 Encounter: [id number]  Primary Care Provider: Ramiro Bansal MD   Date and time admitted to hospital: 10/29/2022  8:46 PM    * COPD with acute exacerbation [facility])  Assessment & Plan  · Recent discharge on 10/25 following hospitalization for COPD  · Patient reports she has not been able to take her newly prescribed Anoro at home due to cost   Will check if STIOLTO  is covered  · Reports some improvement in symptoms after neb treatments in ED  · TID Xopenex and atrovent ordered   · Was on prednisone taper; transitioned back to solumedrol IV, Plan to switch to PO prednisone today and plan to DC tomorrow if stable  · Continue Azithromycin   · Incentive spirometry and pulmonary toileting   · Weaned off supplemental O2    Acute respiratory failure with hypoxia (HCC)  Assessment & Plan  · Home oxygen evaluation ordered: as per nursing eval, pt does not require home O2 at this time  ·  Recently discharged on 10/25 following stay for COPD exacerbation/demand ischemia  Reports she was doing well initially however worsening shortness of breath which began 2 days prior to admission associated with increased productive cough  · Was 80%O2 RA on ED arrival  · Currently 93-96% O2 2L NC  · Experiencing COPD exacerbation as below  · Continue supplemental oxygen; wean as appropriate    Myocardial injury  Assessment & Plan  · Troponin peaked at 555  No chest pain  · Twelve lead EKG notes normal sinus rhythm with incomplete right bundle-branch block and nonspecific ST abnormalities (appears unchanged from EKG on 10/24)  · During recent admission she was seen by Cardiology and echo was done which noted EF 60% with grade 1 diastolic dysfunction and no wall motion abnormality  · Currently denies any active chest pain  · Continue with aspirin/statin  · Monitor on telemetry      Primary hypertension  Assessment & Plan  /78   Pulse 57   Temp 98 1 °F (36 7 °C)   Resp 15   Ht 5' 3" (1 6 m)   Wt 80 7 kg (177 lb 14 6 oz)   SpO2 94%   BMI 31 52 kg/m²   · Stable pressures  · Hypertensive urgency upon presentation which has now resolved  Continue home lisinopril with holding parameter  · Monitor BP per unit protocol         VTE Pharmacologic Prophylaxis: VTE Score: 6 High Risk (Score >/= 5) - Pharmacological DVT Prophylaxis Ordered: enoxaparin (Lovenox)  Sequential Compression Devices Ordered  Patient Centered Rounds: I performed bedside rounds with nursing staff today  Discussions with Specialists or Other Care Team Provider: n/a    Education and Discussions with Family / Patient: son has been kept updated  Time Spent for Care: 20 minutes  More than 50% of total time spent on counseling and coordination of care as described above  Current Length of Stay: 2 day(s)  Current Patient Status: Inpatient   Certification Statement: The patient will continue to require additional inpatient hospital stay due to need for transition to PO steroids and see the response  Discharge Plan: Anticipate discharge tomorrow to home  Code Status: Level 1 - Full Code    Subjective:   Seen and examined at bedside  Pt is feeling much better  Deep inspiratory cough  No fever or CP or worsening sob    Objective:     Vitals:   Temp (24hrs), Av 4 °F (36 9 °C), Min:98 1 °F (36 7 °C), Max:98 8 °F (37 1 °C)    Temp:  [98 1 °F (36 7 °C)-98 8 °F (37 1 °C)] 98 1 °F (36 7 °C)  HR:  [57-62] 57  Resp:  [15-17] 15  BP: (116-136)/(61-78) 136/78  SpO2:  [92 %-96 %] 94 %  Body mass index is 31 52 kg/m²  Input and Output Summary (last 24 hours):      Intake/Output Summary (Last 24 hours) at 2022 1359  Last data filed at 2022 0602  Gross per 24 hour   Intake --   Output 1300 ml   Net -1300 ml       Physical Exam:   Physical Exam General- Awake, alert and oriented x 3, looks comfortable  HEENT- Normocephalic, atraumatic, oral mucosa- moist  Neck- Supple, No carotid bruit, no JVD  CVS- Normal S1/ S2, Regular rate and rhythm, No murmur, No edema  Respiratory system- deep inspiratory cough noted, wheezing has improved  Abdomen- Soft, Non distended, no tenderness, Bowel sound- present 4 quads  Genitourinary- No suprapubic tenderness, No CVA tenderness  Skin- No new bruise or rash  Musculoskeletal- No gross deformity  Psych- No acute psychosis  CNS- CN II- XII grossly intact, No acute focal neurologic deficit noted      Additional Data:     Labs:  Results from last 7 days   Lab Units 10/31/22  0900   WBC Thousand/uL 14 23*   HEMOGLOBIN g/dL 15 7*   HEMATOCRIT % 48 6*   PLATELETS Thousands/uL 153   NEUTROS PCT % 89*   LYMPHS PCT % 5*   MONOS PCT % 5   EOS PCT % 0     Results from last 7 days   Lab Units 10/31/22  0900 10/30/22  0510 10/29/22  2106   SODIUM mmol/L 139   < > 138   POTASSIUM mmol/L 4 7   < > 3 3*   CHLORIDE mmol/L 101   < > 99   CO2 mmol/L 29   < > 32   BUN mg/dL 18   < > 14   CREATININE mg/dL 0 90   < > 0 78   ANION GAP mmol/L 9   < > 7   CALCIUM mg/dL 9 2   < > 8 7   ALBUMIN g/dL  --   --  3 5   TOTAL BILIRUBIN mg/dL  --   --  1 03*   ALK PHOS U/L  --   --  93   ALT U/L  --   --  101*   AST U/L  --   --  64*   GLUCOSE RANDOM mg/dL 288*   < > 134    < > = values in this interval not displayed  Results from last 7 days   Lab Units 10/30/22  0510   PROCALCITONIN ng/ml 0 05       Lines/Drains:  Invasive Devices  Report    Peripheral Intravenous Line  Duration           Peripheral IV 10/30/22 Right;Ventral (anterior) Forearm 2 days                      Imaging: No pertinent imaging reviewed      Recent Cultures (last 7 days):         Last 24 Hours Medication List:   Current Facility-Administered Medications   Medication Dose Route Frequency Provider Last Rate   • acetaminophen  650 mg Oral Q6H PRN Salvatoren TEA Tatum     • ALPRAZolam  0 25 mg Oral BID PRN Kevin Tatum PA-C     • aspirin  81 mg Oral Daily [place], [place]     • atorvastatin  40 mg Oral Daily [place], [place]     • azithromycin  500 mg Oral Q24H [place], [place]     • enoxaparin  40 mg Subcutaneous Daily [place], [place]     • guaiFENesin  600 mg Oral Q12H [address] [place], [place]     • ipratropium  0 5 mg Nebulization TID Kary Mccabe MD     • levalbuterol  1 25 mg Nebulization TID Kary Mccabe MD     • lisinopril  20 mg [facility] Daily [facility]     • nicotine  1 patch Transdermal Daily [place], [place]     • predniSONE  40 mg Oral Daily Palmira Yeager MD     • sodium chloride  1 spray Each Nare Q1H PRN Kary Mccabe MD     • umeclidinium-vilanterol  1 puff Inhalation Daily Annita Ortiz PA-C          Today, Patient Was Seen By: Palmira Yeager    **Please Note: This note may have been constructed using a voice recognition system  **

## 2022-11-01 NOTE — ASSESSMENT & PLAN NOTE
· Home oxygen evaluation ordered: as per nursing eval, pt does not require home O2 at this time  ·  Recently discharged on 10/25 following stay for COPD exacerbation/demand ischemia  Reports she was doing well initially however worsening shortness of breath which began 2 days prior to admission associated with increased productive cough    · Was 80%O2 RA on ED arrival  · Currently 93-96% O2 2L NC  · Experiencing COPD exacerbation as below  · Continue supplemental oxygen; wean as appropriate

## 2022-11-02 VITALS
RESPIRATION RATE: 16 BRPM | HEART RATE: 66 BPM | TEMPERATURE: 99 F | HEIGHT: 63 IN | WEIGHT: 177.91 LBS | DIASTOLIC BLOOD PRESSURE: 68 MMHG | OXYGEN SATURATION: 93 % | SYSTOLIC BLOOD PRESSURE: 142 MMHG | BODY MASS INDEX: 31.52 KG/M2

## 2022-11-02 RX ORDER — BUDESONIDE 90 UG/1
1 AEROSOL, POWDER RESPIRATORY (INHALATION) 2 TIMES DAILY
Qty: 1 EACH | Refills: 0 | Status: SHIPPED | OUTPATIENT
Start: 2022-11-02 | End: 2022-11-02

## 2022-11-02 RX ORDER — METOPROLOL SUCCINATE 50 MG/1
25 TABLET, EXTENDED RELEASE ORAL DAILY
Refills: 0
Start: 2022-11-02

## 2022-11-02 RX ORDER — SODIUM CHLORIDE FOR INHALATION 0.9 %
3 VIAL, NEBULIZER (ML) INHALATION
Status: DISCONTINUED | OUTPATIENT
Start: 2022-11-02 | End: 2022-11-02 | Stop reason: HOSPADM

## 2022-11-02 RX ORDER — IPRATROPIUM BROMIDE AND ALBUTEROL SULFATE 2.5; .5 MG/3ML; MG/3ML
3 SOLUTION RESPIRATORY (INHALATION) 4 TIMES DAILY
Qty: 360 ML | Refills: 0 | Status: SHIPPED | OUTPATIENT
Start: 2022-11-02 | End: 2022-12-02

## 2022-11-02 RX ORDER — IPRATROPIUM/ALBUTEROL SULFATE 20-100 MCG
1 MIST INHALER (GRAM) INHALATION 4 TIMES DAILY
Qty: 4 G | Refills: 0 | Status: SHIPPED | OUTPATIENT
Start: 2022-11-02 | End: 2022-11-02

## 2022-11-02 RX ORDER — PREDNISONE 20 MG/1
TABLET ORAL
Qty: 15 TABLET | Refills: 0 | Status: SHIPPED | OUTPATIENT
Start: 2022-11-03 | End: 2022-11-15

## 2022-11-02 RX ORDER — BUDESONIDE 0.5 MG/2ML
0.5 INHALANT ORAL 2 TIMES DAILY
Qty: 120 ML | Refills: 0 | Status: SHIPPED | OUTPATIENT
Start: 2022-11-02 | End: 2022-12-02

## 2022-11-02 RX ADMIN — ATORVASTATIN CALCIUM 40 MG: 40 TABLET, FILM COATED ORAL at 09:31

## 2022-11-02 RX ADMIN — LEVALBUTEROL HYDROCHLORIDE 1.25 MG: 1.25 SOLUTION, CONCENTRATE RESPIRATORY (INHALATION) at 07:20

## 2022-11-02 RX ADMIN — GUAIFENESIN 600 MG: 600 TABLET, EXTENDED RELEASE ORAL at 09:32

## 2022-11-02 RX ADMIN — ENOXAPARIN SODIUM 40 MG: 40 INJECTION SUBCUTANEOUS at 09:32

## 2022-11-02 RX ADMIN — LISINOPRIL 20 MG: 20 TABLET ORAL at 09:32

## 2022-11-02 RX ADMIN — PREDNISONE 40 MG: 20 TABLET ORAL at 09:32

## 2022-11-02 RX ADMIN — UMECLIDINIUM BROMIDE AND VILANTEROL TRIFENATATE 1 PUFF: 62.5; 25 POWDER RESPIRATORY (INHALATION) at 09:34

## 2022-11-02 RX ADMIN — ASPIRIN 81 MG: 81 TABLET, COATED ORAL at 09:32

## 2022-11-02 RX ADMIN — IPRATROPIUM BROMIDE 0.5 MG: 0.5 SOLUTION RESPIRATORY (INHALATION) at 07:20

## 2022-11-02 NOTE — ASSESSMENT & PLAN NOTE
· Resolved  · Home oxygen evaluation ordered: as per nursing eval, pt does not require home O2 at this time  ·  Recently discharged on 10/25 following stay for COPD exacerbation/demand ischemia  Reports she was doing well initially however worsening shortness of breath which began 2 days prior to admission associated with increased productive cough    · Was 80%O2 RA on ED arrival  · Currently 93-96% O2 2L NC  · Experiencing COPD exacerbation as below  · Continue supplemental oxygen; wean as appropriate

## 2022-11-02 NOTE — PLAN OF CARE
Problem: Potential for Falls  Goal: Patient will remain free of falls  Description: INTERVENTIONS:  - Educate patient/family on patient safety including physical limitations  - Instruct patient to call for assistance with activity   - Consult OT/PT to assist with strengthening/mobility   - Keep Call bell within reach  - Keep bed low and locked with side rails adjusted as appropriate  - Keep care items and personal belongings within reach  - Initiate and maintain comfort rounds  - Make Fall Risk Sign visible to staff  - Initiate/Maintain  bed/chair alarm  - Apply yellow socks and bracelet for high fall risk patients  - Consider moving patient to room near nurses station  Outcome: Progressing     Problem: Nutrition/Hydration-ADULT  Goal: Nutrient/Hydration intake appropriate for improving, restoring or maintaining nutritional needs  Description: Monitor and assess patient's nutrition/hydration status for malnutrition  Collaborate with interdisciplinary team and initiate plan and interventions as ordered  Monitor patient's weight and dietary intake as ordered or per policy  Utilize nutrition screening tool and intervene as necessary  Determine patient's food preferences and provide high-protein, high-caloric foods as appropriate       INTERVENTIONS:  - Monitor oral intake, urinary output, labs, and treatment plans  - Assess nutrition and hydration status and recommend course of action  - Evaluate amount of meals eaten  - Assist patient with eating if necessary   - Allow adequate time for meals  - Recommend/ encourage appropriate diets, oral nutritional supplements, and vitamin/mineral supplements  - Order, calculate, and assess calorie counts as needed  - Recommend, monitor, and adjust tube feedings and TPN/PPN based on assessed needs  - Assess need for intravenous fluids  - Provide specific nutrition/hydration education as appropriate  - Include patient/family/caregiver in decisions related to nutrition  Outcome: Progressing     Problem: RESPIRATORY - ADULT  Goal: Achieves optimal ventilation and oxygenation  Description: INTERVENTIONS:  - Assess for changes in respiratory status  - Assess for changes in mentation and behavior  - Position to facilitate oxygenation and minimize respiratory effort  - Oxygen administered by appropriate delivery if ordered  - Initiate smoking cessation education as indicated  - Encourage broncho-pulmonary hygiene including cough, deep breathe, Incentive Spirometry  - Assess the need for suctioning and aspirate as needed  - Assess and instruct to report SOB or any respiratory difficulty  - Respiratory Therapy support as indicated  Outcome: Progressing     Problem: METABOLIC, FLUID AND ELECTROLYTES - ADULT  Goal: Electrolytes maintained within normal limits  Description: INTERVENTIONS:  - Monitor labs and assess patient for signs and symptoms of electrolyte imbalances  - Administer electrolyte replacement as ordered  - Monitor response to electrolyte replacements, including repeat lab results as appropriate  - Instruct patient on fluid and nutrition as appropriate  Outcome: Progressing  Goal: Fluid balance maintained  Description: INTERVENTIONS:  - Monitor labs   - Monitor I/O and WT  - Instruct patient on fluid and nutrition as appropriate  - Assess for signs & symptoms of volume excess or deficit  Outcome: Progressing     Problem: MOBILITY - ADULT  Goal: Maintain or return to baseline ADL function  Description: INTERVENTIONS:  -  Assess patient's ability to carry out ADLs; assess patient's baseline for ADL function and identify physical deficits which impact ability to perform ADLs (bathing, care of mouth/teeth, toileting, grooming, dressing, etc )  - Assess/evaluate cause of self-care deficits   - Assess range of motion  - Assess patient's mobility; develop plan if impaired  - Assess patient's need for assistive devices and provide as appropriate  - Encourage maximum independence but intervene and supervise when necessary  - Involve family in performance of ADLs  - Assess for home care needs following discharge   - Consider OT consult to assist with ADL evaluation and planning for discharge  - Provide patient education as appropriate  Outcome: Progressing  Goal: Maintains/Returns to pre admission functional level  Description: INTERVENTIONS:  - Perform BMAT or MOVE assessment daily    - Set and communicate daily mobility goal to care team and patient/family/caregiver  - Collaborate with rehabilitation services on mobility goals if consulted  - Perform Range of Motion  times a day  - Reposition patient every  hours    - Dangle patient  times a day  - Stand patient  times a day  - Ambulate patient  times a day  - Out of bed to chair  times a day   - Out of bed for meals times a day  - Out of bed for toileting  - Record patient progress and toleration of activity level   Outcome: Progressing

## 2022-11-02 NOTE — ASSESSMENT & PLAN NOTE
· Continue metoprolol at half the dose at 25 mg daily  ·  Troponin peaked at 555  No chest pain  · Twelve lead EKG notes normal sinus rhythm with incomplete right bundle-branch block and nonspecific ST abnormalities (appears unchanged from EKG on 10/24)  · During recent admission she was seen by Cardiology and echo was done which noted EF 60% with grade 1 diastolic dysfunction and no wall motion abnormality  · Currently denies any active chest pain  · Continue with aspirin/statin  · Monitor on telemetry

## 2022-11-02 NOTE — DISCHARGE SUMMARY
[address]  Discharge- Annella Class Colon 1955, 78 y o  female MRN: [id number]  Unit/Bed#: -01 Encounter: [id number]  Primary Care Provider: Didi Sanches MD   Date and time admitted to hospital: 10/29/2022  8:46 PM    * COPD with acute exacerbation [facility])  Assessment & Plan  · Resolved  · Recent discharge on 10/25 following hospitalization for COPD  · Patient reports she has not been able to take her newly prescribed Anoro at home due to cost   Will check if STIOLTO  is covered  · Reports some improvement in symptoms after neb treatments in ED  · TID Xopenex and atrovent ordered   · Was on prednisone taper; transitioned back to solumedrol IV, Plan to switch to PO prednisone today and plan to DC tomorrow if stable  · Continue Azithromycin   · Incentive spirometry and pulmonary toileting   · Weaned off supplemental O2    Acute respiratory failure with hypoxia (HCC)  Assessment & Plan  · Resolved  · Home oxygen evaluation ordered: as per nursing eval, pt does not require home O2 at this time  ·  Recently discharged on 10/25 following stay for COPD exacerbation/demand ischemia  Reports she was doing well initially however worsening shortness of breath which began 2 days prior to admission associated with increased productive cough  · Was 80%O2 RA on ED arrival  · Currently 93-96% O2 2L NC  · Experiencing COPD exacerbation as below  · Continue supplemental oxygen; wean as appropriate    Tobacco abuse disorder  Assessment & Plan  · Contributing to patient's poor respiratory status as above  · Counseled for complete cessation  · Nicotine patch ordered   · Patient has quit about a week ago  Myocardial injury  Assessment & Plan  · Continue metoprolol at half the dose at 25 mg daily  ·  Troponin peaked at 555    No chest pain  · Twelve lead EKG notes normal sinus rhythm with incomplete right bundle-branch block and nonspecific ST abnormalities (appears unchanged from EKG on 10/24)  · During recent admission she was seen by Cardiology and echo was done which noted EF 60% with grade 1 diastolic dysfunction and no wall motion abnormality  · Currently denies any active chest pain  · Continue with aspirin/statin  · Monitor on telemetry  Primary hypertension  Assessment & Plan  /68   Pulse 66   Temp 99 °F (37 2 °C)   Resp 16   Ht 5' 3" (1 6 m)   Wt 80 7 kg (177 lb 14 6 oz)   SpO2 93%   BMI 31 52 kg/m²   · Stable pressures  · Hypertensive urgency upon presentation which has now resolved  Continue home lisinopril with holding parameter  · Monitor BP per unit protocol         Medical Problems             Resolved Problems  Date Reviewed: 11/2/2022   None               Discharging Physician / Practitioner: Berhane Becerra  PCP: Kalen Modi MD  Admission Date:   Admission Orders (From admission, onward)     Ordered        10/30/22 1342  Inpatient Admission  Once            10/29/22 2216  Place in Observation  Once                      Discharge Date: 11/02/22    Consultations During Hospital Stay:  IP CONSULT TO CASE MANAGEMENT  · IP CONSULT TO CASE MANAGEMENT      Procedures Performed:   XR chest 2 views   Final Result by Chen Arenas MD (10/30 4379)      Mild pulmonary venous congestion  Workstation performed: [id number]         ·   ·     Significant Findings / Test Results:   · COPD exacerbation    Incidental Findings:   · None     Test Results Pending at Discharge (will require follow up): · None     Outpatient Tests Requested:  · None    Complications:  None    Reason for Admission:  COPD exacerbation    Hospital Course:   Shashank Plascencia is a 78 y o  female patient who originally presented to the hospital on 10/29/2022 due to COPD exacerbation  Patient says that she is unable to afford [place]/inhaled corticosteroid inhalers and she does not have insurance covering her pharmacy    Patient was treated for COPD exacerbation with IV steroids and Breo and other medications and COPD exacerbation has resolved at this time  She still remains at a very high risk for exacerbation as she does not have maintenance medications at home  Secondary to the point that she cannot afford these medications arrangement for nebulizer and nebulized medications at home has been made  She is being discharged on inhaled corticosteroid through nebulizer and DuoNebs through nebulizer  This has been  To the patient in detail and she is in agreement  Cleared for discharge with close outpatient follow-up with Pulmonary team   Pulmonary rehab ordered at discharge    Please see above list of diagnoses and related plan for additional information  Condition at Discharge: stable    Discharge Day Visit / Exam:   Subjective:  I feeling much better, no chest pain or worsening shortness of breath  Vitals: Blood Pressure: 142/68 (11/02/22 0810)  Pulse: 66 (11/02/22 0810)  Temperature: 99 °F (37 2 °C) (11/02/22 0810)  Temp Source: Oral (10/31/22 0915)  Respirations: 16 (11/01/22 2258)  Height: 5' 3" (160 cm) (11/01/22 1100)  Weight - Scale: 80 7 kg (177 lb 14 6 oz) (11/01/22 1100)  SpO2: 93 % (11/02/22 0810)  Exam:   Physical Exam General- Awake, alert and oriented x 3, looks comfortable  HEENT- Normocephalic, atraumatic, oral mucosa- moist  Neck- Supple, No carotid bruit, no JVD  CVS- Normal S1/ S2, Regular rate and rhythm, No murmur, No edema  Respiratory system- mild congestion and wheezing bilaterally, deep inspiratory cough  Abdomen- Soft, Non distended, no tenderness, Bowel sound- present 4 quads  Genitourinary- No suprapubic tenderness, No CVA tenderness  Skin- No new bruise or rash  Musculoskeletal- No gross deformity  Psych- No acute psychosis  CNS- CN II- XII grossly intact, No acute focal neurologic deficit noted      Discussion with Family: Patient's family has been kept updated       Discharge instructions/Information to patient and family:   See after visit summary for information provided to patient and family  Provisions for Follow-Up Care:  See after visit summary for information related to follow-up care and any pertinent home health orders  Disposition:   Home    Planned Readmission:  No     Discharge Statement:  I spent 35 minutes discharging the patient  This time was spent on the day of discharge  I had direct contact with the patient on the day of discharge  Greater than 50% of the total time was spent examining patient, answering all patient questions, arranging and discussing plan of care with patient as well as directly providing post-discharge instructions  Additional time then spent on discharge activities  Discharge Medications:  See after visit summary for reconciled discharge medications provided to patient and/or family        **Please Note: This note may have been constructed using a voice recognition system**

## 2022-11-02 NOTE — ASSESSMENT & PLAN NOTE
· Resolved  · Recent discharge on 10/25 following hospitalization for COPD  · Patient reports she has not been able to take her newly prescribed Anoro at home due to cost   Will check if STIOLTO  is covered  · Reports some improvement in symptoms after neb treatments in ED  · TID Xopenex and atrovent ordered   · Was on prednisone taper; transitioned back to solumedrol IV, Plan to switch to PO prednisone today and plan to DC tomorrow if stable  · Continue Azithromycin   · Incentive spirometry and pulmonary toileting   · Weaned off supplemental O2

## 2022-11-02 NOTE — ASSESSMENT & PLAN NOTE
· Contributing to patient's poor respiratory status as above  · Counseled for complete cessation  · Nicotine patch ordered   · Patient has quit about a week ago

## 2022-11-02 NOTE — DISCHARGE INSTR - AVS FIRST PAGE
You have been started on budesonide nebulizers and DuoNebs nebulizers    Use it every day as instructed  Your given nebulizers as your not able to afford the inhalers and the costing more than 200 dollars any have refused them  Follow-up with Pulmonary within 1-2 weeks  The dose of her metoprolol has been reduced to 25 mg daily  Follow-up with your PCP within 1 week

## 2022-11-02 NOTE — ASSESSMENT & PLAN NOTE
/68   Pulse 66   Temp 99 °F (37 2 °C)   Resp 16   Ht 5' 3" (1 6 m)   Wt 80 7 kg (177 lb 14 6 oz)   SpO2 93%   BMI 31 52 kg/m²   · Stable pressures  · Hypertensive urgency upon presentation which has now resolved  Continue home lisinopril with holding parameter  · Monitor BP per unit protocol

## 2022-11-10 LAB
DME PARACHUTE DELIVERY DATE ACTUAL: NORMAL
DME PARACHUTE DELIVERY DATE REQUESTED: NORMAL
DME PARACHUTE DELIVERY NOTE: NORMAL
DME PARACHUTE ITEM DESCRIPTION: NORMAL
DME PARACHUTE ORDER STATUS: NORMAL
DME PARACHUTE SUPPLIER NAME: NORMAL
DME PARACHUTE SUPPLIER PHONE: NORMAL

## 2023-01-22 ENCOUNTER — HOSPITAL ENCOUNTER (EMERGENCY)
Facility: HOSPITAL | Age: 68
Discharge: HOME | End: 2023-01-22
Attending: EMERGENCY MEDICINE

## 2023-01-22 ENCOUNTER — APPOINTMENT (EMERGENCY)
Dept: CT IMAGING | Facility: HOSPITAL | Age: 68
End: 2023-01-22

## 2023-01-22 VITALS
RESPIRATION RATE: 17 BRPM | DIASTOLIC BLOOD PRESSURE: 79 MMHG | SYSTOLIC BLOOD PRESSURE: 133 MMHG | HEART RATE: 52 BPM | OXYGEN SATURATION: 94 % | TEMPERATURE: 98.6 F | HEIGHT: 64 IN | WEIGHT: 185.41 LBS | BODY MASS INDEX: 31.65 KG/M2

## 2023-01-22 DIAGNOSIS — E04.1 THYROID NODULE: ICD-10-CM

## 2023-01-22 DIAGNOSIS — S09.90XA INJURY OF HEAD, INITIAL ENCOUNTER: ICD-10-CM
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
## 2023-01-22 LAB
ATRIAL RATE: 53 BPM
P AXIS: 75 DEGREES
PR INTERVAL: 136 MS
QRS AXIS: 34 DEGREES
QRSD INTERVAL: 106 MS
QT INTERVAL: 494 MS
QTC INTERVAL: 463 MS
T WAVE AXIS: 55 DEGREES
VENTRICULAR RATE: 53 BPM

## 2023-01-22 NOTE — ED PROVIDER NOTES
History  Chief Complaint   Patient presents with   • Fall     Pt arrives via EMS for evaluation of fall that was 1 week ago  Lisseth Brown from sitting on side of bed to the floor, +headtrike, -LOC, takes a baby aspirin daily  Offers no complaints of pain or dizziness  Large hematoma noted to R forehead  75 y o  F presents for eval of head trauma one week ago  Patient tripped going to the bathroom in the middle of the night, hit head on radiator  On ASA  Since then patient denies syncope events, no CP, no SOB, no headaches  Presents because her son tonight told her that he wanted her evaluated because she has ongoing significant bruising  No focal neuro deficits  Prior to Admission Medications   Prescriptions Last Dose Informant Patient Reported? Taking? ALPRAZolam (XANAX) 0 25 mg tablet   Yes No   Sig: Take 0 25 mg by mouth 2 (two) times a day as needed   albuterol (PROVENTIL HFA,VENTOLIN HFA) 90 mcg/act inhaler   Yes No   Sig: Inhale 2 puffs every 6 (six) hours as needed for wheezing   aspirin (ECOTRIN LOW STRENGTH) 81 mg EC tablet   Yes No   Sig: Take 81 mg by mouth daily   atorvastatin (LIPITOR) 40 mg tablet   Yes No   Sig: Take 40 mg by mouth daily   budesonide (Pulmicort) 0 5 mg/2 mL nebulizer solution   No No   Sig: Take 2 mL (0 5 mg total) by nebulization 2 (two) times a day Rinse mouth after use     guaiFENesin (MUCINEX) 600 mg 12 hr tablet   No No   Sig: Take 1 tablet (600 mg total) by mouth every 12 (twelve) hours   ipratropium (ATROVENT) 0 02 % nebulizer solution   No No   Sig: Take 2 5 mL (0 5 mg total) by nebulization 3 (three) times a day   ipratropium-albuterol (DUO-NEB) 0 5-2 5 mg/3 mL nebulizer solution   No No   Sig: Take 3 mL by nebulization 4 (four) times a day   levalbuterol (XOPENEX) 1 25 mg/0 5 mL nebulizer solution   No No   Sig: Take 0 5 mL (1 25 mg total) by nebulization 3 (three) times a day   lisinopril (ZESTRIL) 20 mg tablet   No No   Sig: Take 1 tablet (20 mg total) by mouth daily Do not start before October 26, 2022  metoprolol succinate (TOPROL-XL) 50 mg 24 hr tablet   No No   Sig: Take 0 5 tablets (25 mg total) by mouth daily   tiotropium-olodaterol (Stiolto Respimat) 2 5-2 5 MCG/ACT inhaler   No No   Sig: Inhale 2 puffs daily      Facility-Administered Medications: None       Past Medical History:   Diagnosis Date   • COPD (chronic obstructive pulmonary disease) ([facility] [address] )    • Hyperlipemia    • Hypertension        History reviewed  No pertinent surgical history  History reviewed  No pertinent family history  I have reviewed and agree with the history as documented  E-Cigarette/Vaping   • E-Cigarette Use Never User      E-Cigarette/Vaping Substances     Social History     Tobacco Use   • Smoking status: Every Day     Packs/day: 1 00     Types: Cigarettes   • Smokeless tobacco: Never   Vaping Use   • Vaping Use: Never used   Substance Use Topics   • Alcohol use: Yes     Alcohol/week: 15 0 standard drinks     Types: 15 Shots of liquor per week   • Drug use: Never       Review of Systems   Constitutional: Negative for chills, fatigue and fever  HENT: Negative for congestion and rhinorrhea  Respiratory: Negative for chest tightness and shortness of breath  Cardiovascular: Negative for chest pain and leg swelling  Gastrointestinal: Negative for abdominal pain, nausea and vomiting  Musculoskeletal: Negative for back pain and neck pain  Skin: Positive for color change (bruising to R forehead and face)  Negative for wound  Neurological: Negative for dizziness, syncope, weakness, light-headedness, numbness and headaches  Hematological: Bruises/bleeds easily (on ASA)  Psychiatric/Behavioral: Negative for confusion  Physical Exam  Physical Exam  Vitals reviewed  Constitutional:       General: She is not in acute distress  Appearance: She is well-developed  She is not ill-appearing, toxic-appearing or diaphoretic  HENT:      Head: Normocephalic  Comments: Bruising and hematoma to R forehead  Bruising to face   No crepitus  No deformity     Right Ear: Tympanic membrane and external ear normal       Left Ear: Tympanic membrane and external ear normal       Ears:      Comments: No hidalgo signs  Eyes:      General:         Right eye: No discharge  Left eye: No discharge  Conjunctiva/sclera: Conjunctivae normal       Pupils: Pupils are equal, round, and reactive to light  Neck:      Trachea: No tracheal deviation  Comments: No midline tenderness, no step offs  Cardiovascular:      Rate and Rhythm: Normal rate and regular rhythm  Heart sounds: Normal heart sounds  Pulmonary:      Effort: Pulmonary effort is normal  No respiratory distress  Breath sounds: Normal breath sounds  No stridor  No wheezing  Chest:      Chest wall: No tenderness  Abdominal:      General: There is no distension  Palpations: Abdomen is soft  Tenderness: There is no abdominal tenderness  There is no guarding  Comments: Stable pelvis   Musculoskeletal:         General: No tenderness or deformity  Normal range of motion  Cervical back: Normal range of motion and neck supple  Comments: Back is non-tender, no step offs   Skin:     General: Skin is warm and dry  Coloration: Skin is not pale  Comments: No abrasions, no lacerations, no contusions  Neurological:      Mental Status: She is alert and oriented to person, place, and time  Cranial Nerves: No cranial nerve deficit  Sensory: No sensory deficit        Comments: GCS = 15   Psychiatric:         Behavior: Behavior normal          Vital Signs  ED Triage Vitals   Temperature Pulse Respirations Blood Pressure SpO2   01/22/23 0101 01/22/23 0101 01/22/23 0101 01/22/23 0101 01/22/23 0101   98 6 °F (37 °C) (!) 50 17 159/73 97 %      Temp Source Heart Rate Source Patient Position - Orthostatic VS BP Location FiO2 (%)   01/22/23 0101 01/22/23 0115 01/22/23 0101 01/22/23 0101 --   Oral Monitor Sitting Right arm       Pain Score       --                  Vitals:    01/22/23 0101 01/22/23 0115 01/22/23 0145 01/22/23 0300   BP: 159/73 159/73 150/77 133/79   Pulse: (!) 50 58 (!) 54 (!) 52   Patient Position - Orthostatic VS: Sitting Sitting Sitting Sitting         Visual Acuity  Visual Acuity    Flowsheet Row Most Recent Value   L Pupil Size (mm) 3   R Pupil Size (mm) 3          ED Medications  Medications - No data to display    Diagnostic Studies  Results Reviewed     None                 CT head without contrast   ED Interpretation by Juan Melendez DO (01/22 0256)   No acute intracranial abnormality  Sinus disease      Final Result by Mal Morgan DO (01/22 0155)      No acute intracranial abnormality  Sinus disease                  Workstation performed: [id number]         CT spine cervical without contrast   ED Interpretation by Juan Melendez DO (01/22 0256)   No cervical spine fracture or traumatic malalignment      Incidental thyroid nodule(s) for which nonemergent thyroid ultrasound is recommended  Final Result by Mal Morgan DO (01/22 0216)      No cervical spine fracture or traumatic malalignment  Incidental thyroid nodule(s) for which nonemergent thyroid ultrasound is recommended  Workstation performed: [id number]                    Procedures  Procedures         ED Course                               SBIRT 22yo+    Flowsheet Row Most Recent Value   SBIRT (23 yo +)    In order to provide better care to our patients, we are screening all of our patients for alcohol and drug use  Would it be okay to ask you these screening questions? Yes Filed at: 01/22/2023 0106   Initial Alcohol Screen: US AUDIT-C     1  How often do you have a drink containing alcohol? 6 Filed at: 01/22/2023 0106   2  How many drinks containing alcohol do you have on a typical day you are drinking? 3 Filed at: 01/22/2023 0106   3b   FEMALE Any Age, or MALE 65+: How often do you have 4 or more drinks on one occassion? 0 Filed at: 01/22/2023 0106   Audit-C Score 9 Filed at: 01/22/2023 0106   Full Alcohol Screen: US AUDIT    4  How often during the last year have you found that you were not able to stop drinking once you had started? 0 Filed at: 01/22/2023 0106   5  How often during past year have you failed to do what was normally expected of you because of drinking? 0 Filed at: 01/22/2023 0106   6  How often in past year have you needed a first drink in the morning to get yourself going after a heavy drinking session? 0 Filed at: 01/22/2023 0106   7  How often in past year have you had feeling of guilt or remorse after drinking? 0 Filed at: 01/22/2023 0106   8  How often in past year have you been unable to remember what happened night before because you had been drinking? 0 Filed at: 01/22/2023 0106   9  Have you or someone else been injured as a result of your drinking? 0 Filed at: 01/22/2023 0106   10  Has a relative, friend, doctor or other health worker been concerned about your drinking and suggested you cut down?  0 Filed at: 01/22/2023 0106   AUDIT Total Score 9 Filed at: 01/22/2023 5526   GHANSHYAM: How many times in the past year have you    Used an illegal drug or used a prescription medication for non-medical reasons? Never Filed at: 01/22/2023 0106                    Medical Decision Making  Mechanical trip and fall, head injury 1 week ago, presents for evaluation for intracranial hemorrhage  No chest pain, no shortness of breath, no syncope since the event  Considering this was a mechanical trip and fall, does not require ACS or syncope work-up  CT head and CT neck are normal   Incidental thyroid nodule, discussed this with patient to follow-up with primary care provider  Discharge to follow-up outpatient with primary care provider and return to emergency department if worsening condition      Fall, initial encounter: acute illness or injury  Injury of head, initial encounter: acute illness or injury  Thyroid nodule: self-limited or minor problem  Amount and/or Complexity of Data Reviewed  Radiology: ordered  Disposition  Final diagnoses:   Fall, initial encounter   Injury of head, initial encounter   Thyroid nodule     Time reflects when diagnosis was documented in both MDM as applicable and the Disposition within this note     Time User Action Codes Description Comment    1/22/2023  2:56 AM Raul Heardsten Add [K43  BZSD] Fall, initial encounter     1/22/2023  2:56 AM Raul Heardsten Add [S09 90XA] Injury of head, initial encounter     1/22/2023  2:57 AM CoppersmithRaul Chayito Add [E04 1] Thyroid nodule       ED Disposition     ED Disposition   Discharge    Condition   Stable    Date/Time   Sun Jan 22, 2023  2:56 AM    Comment   Nelda Fonseca discharge to home/self care  Follow-up Information     Follow up With Specialties Details Why Contact Info Additional Information    Viry Odell MD Internal Medicine Schedule an appointment as soon as possible for a visit  For follow up to ensure improvement, and for further testing and treatment as needed [address] [facility] [address] [facility] Emergency Department Emergency Medicine  If symptoms worsen: headache, worsening symptoms, falling, passing out, etc [address] [facility] [address] [facility] Emergency Department, [address] [place], [place], [address]          Patient's Medications   Discharge Prescriptions    No medications on file       No discharge procedures on file      PDMP Review     None          ED Provider  Electronically Signed by           Shannon Alexandre DO  01/22/23 9056

## 2023-01-22 NOTE — DISCHARGE INSTRUCTIONS
No bleeding inside the head  Please come back to the ED if you have worsening symptoms  Take tylenol at home if you have headaches  Please follow up w the PCP for thyroid imaging for an incidental nodule discovered on imaging

## 2025-07-23 ENCOUNTER — HOSPITAL ENCOUNTER (EMERGENCY)
Facility: HOSPITAL | Age: 70
Discharge: HOME | End: 2025-07-24
Payer: MEDICARE

## 2025-07-24 ENCOUNTER — APPOINTMENT (EMERGENCY)
Dept: RADIOLOGY | Facility: HOSPITAL | Age: 70
End: 2025-07-24
Payer: MEDICARE

## 2025-07-24 NOTE — DISCHARGE INSTRUCTIONS
Please take 650mg acetaminophen(tylenol) every 6 hours as needed for pain.    Lidocaine patches are available over the counter at the pharmacy(active ingredient: 4% lidocaine).  Please apply one lidocaine patch to the area daily, leave in place for 12 hours, and remove and leave off for 12 hours before applying another.    Please apply heat or ice to the area for up to 20 minutes at a time to help with pain.

## 2025-07-24 NOTE — ED NOTES
"Pt observed falling from standing position outside of ED by on shift security personnel. Security brought this to this RNs attention. Upon assessment of pt, pt adamantly requesting not to check into ED again. Pt states \"I'm fine\" Pt is a+ox4 at this time, and states she is waiting for friend to pick pt up.       Lorenza Guevara RN  07/24/25 0123    "

## 2025-08-21 NOTE — ED PROVIDER NOTES
MINOR PROCEDURE POST-OP INSTRUCTIONS  For pain, headaches, etc., you may take Tylenol (Acetaminophen) products.  You may resume aspirin, ibuprofen, and all other herbal supplements in 1 week.     Bruising and swelling is maximal 24-48 hours after the procedure. You may notice persistent swelling and/or bruising on the third day. This is normal and is not an infection.       HOW TO IMPROVE SWELLING:    Keep your head elevated for the first 2 nights by propping yourself up with pillows or sleeping in a recliner.     Apply ice to the affected area. Icing may be beneficial in the first 24 hours after your procedure. Ice at your own pace, but more is better.     Keep the pressure dressing intact until your doctor tells you to take it off (if applicable).       WOUND CARE:     Keep Vaseline ointment on the incision at all times for 1 week. You may use Polysporin or Neosporin ointment. However, they may cause dermatitis (blistering and redness) at the incision site.     If applicable, keep all bandages clean and dry. A bandage may initially be applied for wound drainage. The bandage may be removed or changed in the first 24 hours and prior to showering. You may shower the next day. However, do not actively wash/scrub the area and avoid extensive water contact (soaking or swimming) for 1-2 weeks. You may choose to reapply a bandage, but covering the incision is not necessary after the initial bandage has been removed. Most incisions on the face only require ointment on the surface.       If a wrapped pressure dressing is applied, you may shower once the wrap is removed.     Pressure dressing may be removed on __________________________ (enter date).      If dissolvable sutures were used, they typically go away in 1-1½ weeks. After this time, you may stop using the ointment, wash lightly on the area, and apply sunscreen to prevent darkening of the scar.     If intermittent or persistent bleeding is noted, you may hold  Time reflects when diagnosis was documented in both MDM as applicable and the Disposition within this note       Time User Action Codes Description Comment    7/23/2025 11:57 PM Valente Paradaley Add [M75.52] Bursitis of left deltoid           ED Disposition       ED Disposition   Discharge    Condition   --    Date/Time   Thu Jul 24, 2025  1:02 AM    Comment   Neldakaykay Fonseca discharge to home/self care.                   Assessment & Plan       Medical Decision Making  See ED course for MDM.     Amount and/or Complexity of Data Reviewed  Radiology: ordered.    Risk  OTC drugs.  Prescription drug management.        ED Course as of 08/20/25 2103   u Jul 24, 2025   0022 No evidence of septic arthritis on exam.    Exam consistent with likely deltoid bursitis.  No acute osseous abnormalities on XR.  Given multimodal pain regimen, did give single dose of Toradol given normal GFR on chart review.  Will discharge with supportive care measures, return precautions, and orthopedic follow up.  Will apply sling for comfort, however patient is aware that she should perform shoulder ROM exercises as demonstrated multiple times daily to prevent frozen shoulder.       Medications   ketorolac (TORADOL) injection 15 mg (15 mg Intramuscular Given 7/24/25 0004)   acetaminophen (TYLENOL) tablet 975 mg (975 mg Oral Given 7/24/25 0004)       ED Risk Strat Scores                    No data recorded                            History of Present Illness       Chief Complaint   Patient presents with    Arm Pain     Patient c.o left arm pain x 2 weeks. Denies injury.       Past Medical History[1]   Past Surgical History[2]   Family History[3]   Social History[4]   E-Cigarette/Vaping    E-Cigarette Use Never User       E-Cigarette/Vaping Substances      I have reviewed and agree with the history as documented.     Nelda is a 69 yof who presents with progressively worsening left shoulder swelling and pain, onset three weeks ago, without  history of trauma to the area or falls. Pain is worse with overhead movements, is better with rest and ice, is currently moderate in severity. No prior left shoulder surgeries or injuries.  Denies fever, chills, neck pain or stiffness, erythema, warmth, bruising, numbness, or tingling.        Review of Systems   All other systems reviewed and are negative.          Objective       ED Triage Vitals   Temperature Pulse Blood Pressure Respirations SpO2 Patient Position - Orthostatic VS   07/23/25 2310 07/23/25 2310 07/23/25 2310 07/23/25 2310 07/23/25 2310 07/23/25 2310   98 °F (36.7 °C) 56 147/70 18 100 % Sitting      Temp Source Heart Rate Source BP Location FiO2 (%) Pain Score    07/23/25 2310 07/23/25 2310 07/23/25 2310 -- 07/24/25 0004    Temporal Monitor Left arm  8      Vitals      Date and Time Temp Pulse SpO2 Resp BP Pain Score FACES Pain Rating User   07/24/25 0004 -- -- -- -- -- 8 -- KL   07/23/25 2310 98 °F (36.7 °C) 56 100 % 18 147/70 -- -- RO            Physical Exam  Vitals and nursing note reviewed. Exam conducted with a chaperone present.   Constitutional:       General: She is not in acute distress.     Appearance: Normal appearance. She is not ill-appearing.   HENT:      Head: Normocephalic.   Eyes:      Extraocular Movements: Extraocular movements intact.   Cardiovascular:      Rate and Rhythm: Normal rate and regular rhythm.      Pulses: Normal pulses.      Heart sounds: Normal heart sounds. No murmur heard.     No friction rub. No gallop.   Pulmonary:      Effort: Pulmonary effort is normal. No respiratory distress.      Breath sounds: Normal breath sounds.   Abdominal:      General: Abdomen is flat.   Musculoskeletal:      Cervical back: Normal range of motion and neck supple. No rigidity or tenderness.      Right lower leg: No edema.      Left lower leg: No edema.      Comments: LUE: Tenderness to palpation over the left deltoid bursa with associated joint effusion.  No overlying erythema or  pressure to the area for a steady 15 minutes; this will likely stop the oozing.       RECOMMENDED ACTIVITY: (check one)    __X___ Avoid strenuous activity for 1 week. You may do light activity after 2-3 days.     APPOINTMENTS:     If you do not already have an appointment, please stop at the front reception desk or call to schedule (check all that apply).     _X____An appointment is not needed    For Questions or concerns, please contact the office directly at [phone].  If the office is closed, please listen carefully to the prompts, and skip the option #1 to get the call center.     warmth, no underlying crepitus or deformity.  Left shoulder ROM significantly limited in flexion, internal rotation, and external rotation.  No bony tenderness to palpation.  LUE compartments soft and easily compressible.  BUE neurovascularly intact.     No tenderness to palpation or step-offs noted of the cervical and thoracic spine.   Lymphadenopathy:      Cervical: No cervical adenopathy.   Skin:     General: Skin is warm and dry.      Capillary Refill: Capillary refill takes less than 2 seconds.      Coloration: Skin is not pale.      Findings: No rash.   Neurological:      General: No focal deficit present.      Mental Status: She is alert.   Psychiatric:         Mood and Affect: Mood normal.         Behavior: Behavior normal.         Results Reviewed       None            XR shoulder 2+ views LEFT   Final Interpretation by Fidelina Ramos MD (07/24 0933)      No acute osseous abnormality.         Computerized Assisted Algorithm (CAA) may have been used to analyze all applicable images.         Workstation performed: [id number]             Procedures    ED Medication and Procedure Management   Prior to Admission Medications   Prescriptions Last Dose Informant Patient Reported? Taking?   ALPRAZolam (XANAX) 0.25 mg tablet   Yes No   Sig: Take 0.25 mg by mouth 2 (two) times a day as needed   albuterol (PROVENTIL HFA,VENTOLIN HFA) 90 mcg/act inhaler   Yes No   Sig: Inhale 2 puffs every 6 (six) hours as needed for wheezing   aspirin (ECOTRIN LOW STRENGTH) 81 mg EC tablet   Yes No   Sig: Take 81 mg by mouth daily   atorvastatin (LIPITOR) 40 mg tablet   Yes No   Sig: Take 40 mg by mouth daily   budesonide (Pulmicort) 0.5 mg/2 mL nebulizer solution   No No   Sig: Take 2 mL (0.5 mg total) by nebulization 2 (two) times a day Rinse mouth after use.   guaiFENesin (MUCINEX) 600 mg 12 hr tablet   No No   Sig: Take 1 tablet (600 mg total) by mouth every 12 (twelve) hours   ipratropium (ATROVENT) 0.02 % nebulizer solution    No No   Sig: Take 2.5 mL (0.5 mg total) by nebulization 3 (three) times a day   ipratropium-albuterol (DUO-NEB) 0.5-2.5 mg/3 mL nebulizer solution   No No   Sig: Take 3 mL by nebulization 4 (four) times a day   levalbuterol (XOPENEX) 1.25 mg/0.5 mL nebulizer solution   No No   Sig: Take 0.5 mL (1.25 mg total) by nebulization 3 (three) times a day   lisinopril (ZESTRIL) 20 mg tablet   No No   Sig: Take 1 tablet (20 mg total) by mouth daily Do not start before October 26, 2022.   metoprolol succinate (TOPROL-XL) 50 mg 24 hr tablet   No No   Sig: Take 0.5 tablets (25 mg total) by mouth daily   tiotropium-olodaterol (Stiolto Respimat) 2.5-2.5 MCG/ACT inhaler   No No   Sig: Inhale 2 puffs daily      Facility-Administered Medications: None     Discharge Medication List as of 7/24/2025 12:25 AM        CONTINUE these medications which have NOT CHANGED    Details   albuterol (PROVENTIL HFA,VENTOLIN HFA) 90 mcg/act inhaler Inhale 2 puffs every 6 (six) hours as needed for wheezing, Historical Med      ALPRAZolam (XANAX) 0.25 mg tablet Take 0.25 mg by mouth 2 (two) times a day as needed, Starting Fri 9/9/2022, Historical Med      aspirin (ECOTRIN LOW STRENGTH) 81 mg EC tablet Take 81 mg by mouth daily, Historical Med      atorvastatin (LIPITOR) 40 mg tablet Take 40 mg by mouth daily, Starting Mon 9/19/2022, Historical Med      budesonide (Pulmicort) 0.5 mg/2 mL nebulizer solution Take 2 mL (0.5 mg total) by nebulization 2 (two) times a day Rinse mouth after use., Starting Wed 11/2/2022, Until Fri 12/2/2022, Normal      guaiFENesin (MUCINEX) 600 mg 12 hr tablet Take 1 tablet (600 mg total) by mouth every 12 (twelve) hours, Starting Tue 10/25/2022, Normal      ipratropium (ATROVENT) 0.02 % nebulizer solution Take 2.5 mL (0.5 mg total) by nebulization 3 (three) times a day, Starting Wed 11/2/2022, Normal      ipratropium-albuterol (DUO-NEB) 0.5-2.5 mg/3 mL nebulizer solution Take 3 mL by nebulization 4 (four) times a day, Starting  Wed 11/2/2022, Until Fri 12/2/2022, Normal      levalbuterol (XOPENEX) 1.25 mg/0.5 mL nebulizer solution Take 0.5 mL (1.25 mg total) by nebulization 3 (three) times a day, Starting Tue 10/25/2022, Normal      lisinopril (ZESTRIL) 20 mg tablet Take 1 tablet (20 mg total) by mouth daily Do not start before October 26, 2022., Starting Wed 10/26/2022, Normal      metoprolol succinate (TOPROL-XL) 50 mg 24 hr tablet Take 0.5 tablets (25 mg total) by mouth daily, Starting Wed 11/2/2022, No Print      tiotropium-olodaterol (Stiolto Respimat) 2.5-2.5 MCG/ACT inhaler Inhale 2 puffs daily, Starting Tue 11/1/2022, Normal             ED SEPSIS DOCUMENTATION   Time reflects when diagnosis was documented in both MDM as applicable and the Disposition within this note       Time User Action Codes Description Comment    7/23/2025 11:57 PM Amber Parada Add [M75.52] Bursitis of left deltoid                    [1]   Past Medical History:  Diagnosis Date    COPD (chronic obstructive pulmonary disease) (HCC)     Hyperlipemia     Hypertension    [2]   Past Surgical History:  Procedure Laterality Date    US GUIDED THYROID BIOPSY  7/25/2023   [3] No family history on file.  [4]   Social History  Tobacco Use    Smoking status: Every Day     Current packs/day: 1.00     Types: Cigarettes    Smokeless tobacco: Never   Vaping Use    Vaping status: Never Used   Substance Use Topics    Alcohol use: Yes     Alcohol/week: 15.0 standard drinks of alcohol     Types: 15 Shots of liquor per week    Drug use: Never        Amber Parada DO  08/20/25 0388